# Patient Record
Sex: FEMALE | Race: WHITE
[De-identification: names, ages, dates, MRNs, and addresses within clinical notes are randomized per-mention and may not be internally consistent; named-entity substitution may affect disease eponyms.]

---

## 2017-07-20 ENCOUNTER — HOSPITAL ENCOUNTER (OUTPATIENT)
Dept: HOSPITAL 39 - GMAM | Age: 67
End: 2017-07-20
Attending: FAMILY MEDICINE
Payer: MEDICARE

## 2017-07-20 DIAGNOSIS — R53.83: Primary | ICD-10-CM

## 2017-07-21 ENCOUNTER — HOSPITAL ENCOUNTER (OUTPATIENT)
Dept: HOSPITAL 39 - GMAM | Age: 67
Discharge: HOME | End: 2017-07-21
Attending: FAMILY MEDICINE
Payer: MEDICARE

## 2017-07-21 DIAGNOSIS — N39.0: Primary | ICD-10-CM

## 2018-02-09 ENCOUNTER — HOSPITAL ENCOUNTER (OUTPATIENT)
Dept: HOSPITAL 39 - US | Age: 68
End: 2018-02-09
Attending: NURSE PRACTITIONER
Payer: MEDICARE

## 2018-02-09 DIAGNOSIS — K76.0: ICD-10-CM

## 2018-02-09 DIAGNOSIS — R10.9: Primary | ICD-10-CM

## 2018-02-12 NOTE — US
EXAM DESCRIPTION: 



Abdomen,Limited



CLINICAL HISTORY: 



R10.9 UNSPECIFIED ABDOMINAL PAIN.  (LIVER)



COMPARISON: 



None available.



FINDINGS: 



Aorta: Not visualized.

IVC: Not well visualized.

Ascites: None.

Pancreas: Partially obscured by overlying bowel gas but

visualized portions normal.

Liver: The liver appears slightly hyperechoic. No mass or other

focal liver lesion. No intrahepatic biliary duct dilation.

Physiologic flow is noted in the main portal vein.

Gallbladder/Common Duct: The gallbladder is, by history,

surgically absent. The common duct is not dilated, measuring 5 mm

diameter at the emmanuel hepatis.

Right Kidney: No stones, hydronephrosis, atrophy or mass.





IMPRESSION: 



Diffuse fatty infiltration of the liver, otherwise unremarkable

exam.



Electronically signed by:  Dawson Estrada MD  2/12/2018 9:01 AM [facility]

Workstation: [id number]

## 2018-04-08 ENCOUNTER — HOSPITAL ENCOUNTER (EMERGENCY)
Dept: HOSPITAL 39 - ER | Age: 68
LOS: 1 days | Discharge: HOME | End: 2018-04-09
Payer: MEDICARE

## 2018-04-08 VITALS — TEMPERATURE: 97.8 F | DIASTOLIC BLOOD PRESSURE: 99 MMHG | OXYGEN SATURATION: 98 % | SYSTOLIC BLOOD PRESSURE: 207 MMHG

## 2018-04-08 DIAGNOSIS — Z85.3: ICD-10-CM

## 2018-04-08 DIAGNOSIS — G89.29: ICD-10-CM

## 2018-04-08 DIAGNOSIS — E11.9: ICD-10-CM

## 2018-04-08 DIAGNOSIS — I10: ICD-10-CM

## 2018-04-08 DIAGNOSIS — F03.90: ICD-10-CM

## 2018-04-08 DIAGNOSIS — J44.9: ICD-10-CM

## 2018-04-08 DIAGNOSIS — M51.36: Primary | ICD-10-CM

## 2018-04-08 NOTE — ED.PDOC
History of Present Illness





- General


Chief Complaint: Back Pain or Injury


Stated Complaint: low back pain, thinks its from bulging disk


Time Seen by Provider: 04/08/18 23:50


Source: RN notes reviewed, Vital Signs reviewed


Additional Information: 





67 YEAR OLD WHITE FEMALE WHO HAS BEEN DIAGNOSED WITH LUMBAR DISC PROLAPSE IN 

2015  PRESENTS WITH ACUTE EXACERBATION OF THE LOWER BACK PAIN WITH RADIATION TO 

THE RIGHT LEG FOR THE PAST FEW DAYS 


SHE HAS REMISSIONS AND EXACERBATIONS OF THIS CHRONIC BACK ISSUE DEPENDING ON 

THE STRESS SHE ENDURES DOING SIMPLE TASK AT HOME 


SHE ALSO REPORTS PARESTHESIA ON THE RIGHT LOWER EXTREMITY


SHE IN ADDITION HAS DM HTN  AND DISLIPIDEMIA  





- History of Present Illness


Timing/Duration: days


Quality/Severity: moderate


Back Pain Location: lumbar spine


Allergies/Adverse Reactions: 


Allergies





NO KNOWN ALLERGY Allergy (Verified 04/08/18 23:39)


 








Home Medications: 


Ambulatory Orders





Acetaminophen W/ Codeine [Tylenol W/ CODEINE #3] 1 ea PO Q4-6H PRN #14  02/23/

15 


Acetamin W/Cod #3 Tab [Tylenol w/CODEINE #3] 1 ea PO Q6HR PRN #40 tab 04/08/18 


Methocarbamol [Robaxin] 750 mg PO Q8HRS 10 Days #30 tab 04/08/18 











Review of Systems





- Review of Systems


Constitutional: States: no symptoms reported


EENTM: States: no symptoms reported


Respiratory: States: no symptoms reported


Cardiology: States: no symptoms reported


Gastrointestinal/Abdominal: States: no symptoms reported


Genitourinary: States: no symptoms reported


Musculoskeletal: States: no symptoms reported


Skin: States: no symptoms reported


Neurological: States: no symptoms reported


Endocrine: States: no symptoms reported


Hematologic/Lymphatic: States: no symptoms reported





Past Medical History (General)





- Patient Medical History


Hx Seizures: No


Hx Stroke: No


Hx Dementia: Yes


Hx Asthma: No


Hx of COPD: Yes


Hx Cardiac Disorders: No


Hx Congestive Heart Failure: No


Hx Pacemaker: No


Hx Hypertension: Yes


Hx Thyroid Disease: No


Hx Diabetes: Yes


Hx Gastroesophageal Reflux: Yes


Hx Renal Disease: No


Hx Cancer: Yes - breast


Hx of HIV: No


Hx Hepatitis C: No


Hx MRSA: No





- Vaccination History


Hx Tetanus, Diphtheria Vaccination: No


Hx Influenza Vaccination: No





- Social History


Hx Tobacco Use: Yes


Hx Alcohol Use: No


Hx Substance Use: No


Hx Substance Use Treatment: No


Hx Depression: No





- Female History


Patient Pregnant: No





Family Medical History





- Family History


  ** Mother


Family History: Unknown





Physical Exam





- Physical Exam


General Appearance: Anxious, Comfortable


Eyes, Ears, Nose, Throat Exam: PERRL/EOMI, normal ENT inspection, TMs normal


Neck Exam: non-tender, full range of motion, normal alignment


Cardiovascular/Respiratory: regular rate, rhythm, normal peripheral pulses, no 

JVD, normal breath sounds


Gastrointestinal/Abdominal: normal bowel sounds, non tender, soft, no 

organomegaly, no pulsatile mass


Back Exam: muscle spasm


Extremity Exam: normal range of motion, non-tender, no pedal edema


Neurologic: CNs II-XII nml as tested, no motor/sensory deficits, alert, normal 

mood/affect, oriented x 3





Departure





- Departure


Clinical Impression: 


 Degeneration of lumbar intervertebral disc





Disposition: Discharge to Home or Self Care


Condition: Good


Departure Forms:  ED Discharge - Pt. Copy, Patient Portal Self Enrollment


Referrals: 


Greg Dotson MD [Primary Care Provider] - 1-2 Weeks


Prescriptions: 


Acetamin W/Cod #3 Tab [Tylenol w/CODEINE #3] 1 ea PO Q6HR PRN #40 tab


 PRN Reason: Mild To Moderate Pain


Methocarbamol [Robaxin] 750 mg PO Q8HRS 10 Days #30 tab


Home Medications: 


Ambulatory Orders





Acetaminophen W/ Codeine [Tylenol W/ CODEINE #3] 1 ea PO Q4-6H PRN #14  02/23/

15 


Acetamin W/Cod #3 Tab [Tylenol w/CODEINE #3] 1 ea PO Q6HR PRN #40 tab 04/08/18 


Methocarbamol [Robaxin] 750 mg PO Q8HRS 10 Days #30 tab 04/08/18

## 2018-04-16 ENCOUNTER — HOSPITAL ENCOUNTER (EMERGENCY)
Dept: HOSPITAL 39 - ER | Age: 68
Discharge: HOME | End: 2018-04-16
Payer: MEDICARE

## 2018-04-16 VITALS — SYSTOLIC BLOOD PRESSURE: 154 MMHG | OXYGEN SATURATION: 96 % | TEMPERATURE: 97.8 F | DIASTOLIC BLOOD PRESSURE: 75 MMHG

## 2018-04-16 DIAGNOSIS — K76.0: ICD-10-CM

## 2018-04-16 DIAGNOSIS — M51.37: Primary | ICD-10-CM

## 2018-04-16 DIAGNOSIS — G89.29: ICD-10-CM

## 2018-04-16 DIAGNOSIS — J44.9: ICD-10-CM

## 2018-04-16 DIAGNOSIS — Z85.3: ICD-10-CM

## 2018-04-16 DIAGNOSIS — I10: ICD-10-CM

## 2018-04-16 DIAGNOSIS — E11.9: ICD-10-CM

## 2018-04-16 DIAGNOSIS — Z90.13: ICD-10-CM

## 2018-04-16 PROCEDURE — 80053 COMPREHEN METABOLIC PANEL: CPT

## 2018-04-16 PROCEDURE — 74018 RADEX ABDOMEN 1 VIEW: CPT

## 2018-04-16 PROCEDURE — 85025 COMPLETE CBC W/AUTO DIFF WBC: CPT

## 2018-04-16 NOTE — RAD
Procedure:  XR ABDOMEN 1 VIEW (KUB)        



Exam Date:  4/16/2018



Ordering Provider:  STEVEN NGO



Clinical Indication:  constipation 



Comparison: None



Findings: 

There is no bowel distention. Large stool burden in the right

colon.

There is no definite pneumoperitoneum. Diaphragm excluded from

the exam.

There are no suspicious calcifications. Pelvic phleboliths.

There is no acute osseous abnormality.



Impression: 

1. No acute findings.

2. Large stool burden in the right colon.







Electronically signed by:  Alok Holden MD  4/16/2018 6:36 AM CDT

Workstation: [id number]

## 2018-04-16 NOTE — ED.PDOC
History of Present Illness





- General


Chief Complaint: Back Pain or Injury


Stated Complaint: low back pain


Time Seen by Provider: 04/16/18 05:46


Source: patient


Exam Limitations: no limitations





- History of Present Illness


Initial Comments: 








patient comes in today for severe low back pain.  Patient states in 2015 she 

was diagnosed with a ruptured disc in the lumbar spinal area.  Normally she 

does fine with over-the-counter ibuprofen type products.  However, a week ago, 

patient was performing some Spring cleaning and irritated her back.  She was 

brought into the emergency room and given Tylenol 3 and Robaxin.  Patient 

states since then it has failed to improve and she has been in severe pain ever 

since.  Patient states additionally, she has been unable to go the bathroom for 

3 or 4 days and feels like her entire body is swollen and bloated since she was 

given the pain medication.  Patient feels like she is retaining fluid but also 

knows she has not been making as much urine as she normally does.  Patient 

states she feels numbness throughout her entire body and yet still is feeling 

severe sharp pain in the lumbar spinal area.  The pain is much worse when she 

tries to move.  Patient states she was given steroids and that this seems to 

have made things worse as well.


Past medical history


1.  Diabetes mellitus


2.  Hypertension


3.  Chronic back pain


4.  Fatty liver disease 





Past surgical history


1.  Tonsillectomy


2.  Cholecystectomy


3.  Appendectomy


4.  Hysterectomy


5.  Bilateral mastectomy


6.  Breast implants





Social history


Patient has not smoked for several years but has greater than a 30-pack-year 

history.  She does not drink or take illicit substances.  [place] prescription 

monitoring system shows only recent prescription for Tylenol No. 3 and several 

month history of prescription for clonazepam


Timing/Duration: 1 week


Quality/Severity: severe, sharpness


Back Pain Location: lumbar spine, paraspinous muscles


Method of Injury/Prior Injury: other - spring cleaning


Improving Factors: nothing


Worsening Factors: movement


Allergies/Adverse Reactions: 


Allergies





NO KNOWN ALLERGY Allergy (Verified 04/08/18 23:39)


 








Home Medications: 


Ambulatory Orders





Acetaminophen W/ Codeine [Tylenol W/ CODEINE #3] 1 ea PO Q4-6H PRN #14  02/23/

15 


Acetamin W/Cod #3 Tab [Tylenol w/CODEINE #3] 1 ea PO Q6HR PRN #40 tab 04/08/18 


Methocarbamol [Robaxin] 750 mg PO Q8HRS 10 Days #30 tab 04/08/18 


"Low Dose Blood Pressure Med"  04/16/18 


Glyburide  04/16/18 











Review of Systems





- Review of Systems


Constitutional: States: weakness.  Denies: chills, fever


EENTM: States: no symptoms reported.  Denies: ear pain, nose pain, throat pain


Respiratory: States: no symptoms reported.  Denies: cough, short of breath, 

wheezing


Cardiology: States: no symptoms reported.  Denies: chest pain, palpitations, 

syncope


Gastrointestinal/Abdominal: States: no symptoms reported.  Denies: abdominal 

pain, diarrhea, nausea, vomiting


Genitourinary: States: see HPI


Musculoskeletal: States: see HPI


Skin: States: no symptoms reported


Neurological: States: numbness, paresthesia





Past Medical History (General)





- Patient Medical History


Hx Seizures: No


Hx Stroke: No


Hx Dementia: Yes


Hx Asthma: No


Hx of COPD: Yes


Hx Cardiac Disorders: No


Hx Congestive Heart Failure: No


Hx Pacemaker: No


Hx Hypertension: Yes


Hx Thyroid Disease: No


Hx Diabetes: Yes


Hx Gastroesophageal Reflux: Yes


Hx Renal Disease: No


Hx Cancer: Yes - breast


Hx of HIV: No


Hx Hepatitis C: No


Hx MRSA: No


Surgical History: appendectomy, cancer surgery, cholecystectomy, Hysterectomy





- Vaccination History


Hx Tetanus, Diphtheria Vaccination: No


Hx Influenza Vaccination: No


Immunizations Up to Date: Yes





- Social History


Hx Tobacco Use: Yes


Hx Alcohol Use: No


Hx Substance Use: No


Hx Substance Use Treatment: No


Hx Depression: No





- Female History


Patient is a Female of Child Bearing Age (10 -59 yrs old): No


Patient Pregnant: No





- Triage Comment


ED Triage Comment: pain for past week since seen in ER for same 2/08/18.  

States body is numb all over, but also pain all over





Family Medical History





- Family History


  ** Mother


Family History: Unknown





Physical Exam





- Physical Exam


General Appearance: Agitated, Restless


Eyes, Ears, Nose, Throat Exam: PERRL/EOMI, normal ENT inspection, TMs normal, 

pharynx normal


Neck Exam: non-tender, full range of motion, normal alignment


Cardiovascular/Respiratory: regular rate, rhythm, no M/R/G, normal peripheral 

pulses, no JVD


Peripheral Pulses: radial,right: 2+


Gastrointestinal/Abdominal: normal bowel sounds, non tender, soft, no 

organomegaly, no pulsatile mass


Back Exam: vertebral tenderness, other - tenderness to palpation at paraspinal 

muscles andL4-L5 no gross deformities no bruising muscle spasms felt throughout 

the low back area


Neurologic: CNs II-XII nml as tested, alert, oriented x 3, other - DTRs are 2+/

4 motor is 5 out of 5 for  and lower extremity strength


Skin Exam: normal color





Progress





- Progress


Progress: 





04/16/18 06:38


 





04/16/18 06:18


UA [URINALYSIS] Stat 








 Laboratory Results











WBC  11.0 K/mm3 (4.8-10.8)  H  04/16/18  06:00    


 


RBC  4.72 M/mm3 (4.20-5.40)   04/16/18  06:00    


 


Hgb  14.6 gm/dL (12.0-16.0)   04/16/18  06:00    


 


Hct  43.4 % (36.0-47.0)   04/16/18  06:00    


 


MCV  91.9 fl (81.0-99.0)   04/16/18  06:00    


 


MCH  30.9 pg (27.0-31.0)   04/16/18  06:00    


 


MCHC  33.6 g/dL (33.0-37.0)   04/16/18  06:00    


 


RDW  13.5 





% (11.5-14.5)   04/16/18  06:00    


 


Plt Count  246 K/mm3 (130-400)   04/16/18  06:00    


 


MPV  7.6 fl (7.40-10.4)   04/16/18  06:00    


 


Absolute Neuts (auto)  6.70 K/uL (1.8-6.8)   04/16/18  06:00    


 


Absolute Lymphs (auto)  2.60 K/uL (1.0-3.4)   04/16/18  06:00    


 


Absolute Monos (auto)  1.10 K/uL (0.2-0.8)  H  04/16/18  06:00    


 


Absolute Eos (auto)  0.70 K/uL (0.0-0.4)  H  04/16/18  06:00    


 


Absolute Basos (auto)  0.10 K/uL (0.0-0.1)   04/16/18  06:00    


 


Neutrophils %  60.3 % (42.0-78.0)   04/16/18  06:00    


 


Lymphocytes %  23.2 % (20.0-50.0)   04/16/18  06:00    


 


Monocytes %  9.8 % (2.0-9.0)  H  04/16/18  06:00    


 


Eosinophils %  6.2 % (1.0-5.0)  H  04/16/18  06:00    


 


Basophils %  0.5 % (0.0-2.0)   04/16/18  06:00    


 


Sodium  139 mmol/L (135-145)   04/16/18  06:00    


 


Potassium  3.4 mmol/L (3.6-5.0)  L  04/16/18  06:00    


 


Chloride  103 mmol/L (101-111)   04/16/18  06:00    


 


Carbon Dioxide  27 mmol/L (21-31)   04/16/18  06:00    


 


Anion Gap  12.4  (12-18)   04/16/18  06:00    


 


BUN  22 mg/dL (7-18)  H  04/16/18  06:00    


 


Creatinine  0.82 mg/dL (0.6-1.3)   04/16/18  06:00    


 


BUN/Creatinine Ratio  26.8  (10-20)  H  04/16/18  06:00    


 


Random Glucose  114 mg/dL ()  H  04/16/18  06:00    


 


Serum Osmolality  281.7 mOsm/L (275-295)   04/16/18  06:00    


 


Calcium  9.3 mg/dL (8.4-10.2)   04/16/18  06:00    


 


Total Bilirubin  1.1 mg/dL (0.2-1.0)  H  04/16/18  06:00    


 


AST  78 IU/L (10-42)  H  04/16/18  06:00    


 


ALT  90 IU/L (10-60)  H  04/16/18  06:00    


 


Alkaline Phosphatase  124 IU/L ()  H  04/16/18  06:00    


 


Serum Total Protein  6.8 gm/dL (6.4-8.2)   04/16/18  06:00    


 


Albumin  3.7 g/dl (3.2-5.5)   04/16/18  06:00    


 


Globulin  3.1 gm/dL (2.3-3.5)   04/16/18  06:00    


 


Albumin/Globulin Ratio  1.2  (1.1-1.9)   04/16/18  06:00    











04/16/18 06:55


patient does have some relief after IV Tylenol.  She still is in moderately 

severe pain but is able to at least handle the pain able to move some.  She 

states that she does have an MRI scheduled for later today with her doctor in 

[place] hometown.  She does not want anything stronger than this that may cause 

worsening constipation and she will follow up as scheduled for her MRI and with 

her PCP for possible pain management consultation later on today





- Results/Orders


Results/Orders: 





Patient Name: ALYSSA SALMON


Patient ID: [id number]


Gender: Female


YOB: 1950


Home Phone: [phone]


Referring Physician: STEVEN NGO


Organization: [facility]


Accession Number: [id number]


Requested Date: April 16, 2018 05:46


Report Status: Final


Requested Procedure: 1


Procedure Description: Abdomen 1 View


Modality: CR


Findings


Reporting MD: Alok Holden


Fellow MD: Not available


Dictation Time:


: Not available


Transcription Date:


Procedure: XR ABDOMEN 1 VIEW (KUB)


Exam Date: 4/16/2018


Ordering Provider: STEVEN NGO


Clinical Indication: constipation


Comparison: None


Findings:


There is no bowel distention. Large stool burden in the right


colon.


There is no definite pneumoperitoneum. Diaphragm excluded from


the exam.


There are no suspicious calcifications. Pelvic phleboliths.


There is no acute osseous abnormality.


Impression:


1. No acute findings.


2. Large stool burden in the right colon.


Electronically signed by: Alok Holden MD 4/16/2018 6:36 AM CDT


Workstation: [id number]





Departure





- Departure


Clinical Impression: 


 Degeneration of lumbosacral intervertebral disc





Disposition: Discharge to Home or Self Care


Condition: Fair


Departure Forms:  ED Discharge - Pt. Copy, Patient Portal Self Enrollment


Instructions:  DI for Low Back Pain


Activity: increase activity as tolerated


Referrals: 


Greg Dotson MD [Primary Care Provider] - 1-2 Weeks


Home Medications: 


Ambulatory Orders





Acetaminophen W/ Codeine [Tylenol W/ CODEINE #3] 1 ea PO Q4-6H PRN #14  02/23/

15 


Acetamin W/Cod #3 Tab [Tylenol w/CODEINE #3] 1 ea PO Q6HR PRN #40 tab 04/08/18 


Methocarbamol [Robaxin] 750 mg PO Q8HRS 10 Days #30 tab 04/08/18 


"Low Dose Blood Pressure Med"  04/16/18 


Glyburide  04/16/18 








Additional Instructions: 


keep appointment today for MRI and follow-up with PCP for pain management 

consultation.  Return to ER for increasing abdominal pain, numbness, change in 

sensation to extremities.    Would continue MiraLAX 17 g by mouth daily over-the

-counter for constipation for the next several days

## 2018-09-07 ENCOUNTER — HOSPITAL ENCOUNTER (OUTPATIENT)
Dept: HOSPITAL 39 - GMAM | Age: 68
End: 2018-09-07
Attending: FAMILY MEDICINE
Payer: MEDICARE

## 2018-09-07 DIAGNOSIS — R41.82: Primary | ICD-10-CM

## 2018-09-07 DIAGNOSIS — E11.9: ICD-10-CM

## 2018-09-12 ENCOUNTER — HOSPITAL ENCOUNTER (OUTPATIENT)
Dept: HOSPITAL 39 - MRI | Age: 68
End: 2018-09-12
Attending: FAMILY MEDICINE
Payer: MEDICARE

## 2018-09-12 DIAGNOSIS — R41.82: Primary | ICD-10-CM

## 2018-09-12 NOTE — MRI
EXAM DESCRIPTION: 

Brain w/o Contrast : MRI.



CLINICAL HISTORY: 

ACUTE CONFUSION



COMPARISON: 

CT head August 6, 2018 and August 4, 2018.



TECHNIQUE: 

Multiplanar, high-field MRI unit, multiple diffusion sequences,

multiple conventional sequences without contrast. Significant

limitations to the study due to patient motion. 3 sequences

(axial T2, axial T1, and axial gradient echo) were too degraded

for recording.



FINDINGS: 

Minimally hyperintense FLAIR signal in the periventricular white

matter, abutting the frontal horns bilaterally . No hemorrhage,

no cerebral edema, no mass-effect.  Not in the basal ganglia,

which demonstrates hypodense signal. Normal signal in the

brainstem and cerebellar hemispheres. No hemorrhage, no cerebral

edema, no mass-effect.

 

Concordance of the diffusion and non-diffusion sequences with no

diffusion restriction. Cortical sulci, ventricles, and other CSF

spaces, and the subdural spaces are physiologic for the patient's

age. No effacement or displacement. No midline shift. No

extra-axial hemorrhage.

 

Pituitary gland occupies approximately 50% of the sella. Base of

the cerebellar tonsils is at the level of the foramen magnum. The

bony calvarium is intact.



IMPRESSION: 

1. Significant limitations of the study due to patient motion

with 3 sequences to degraded for recording.

2. No mass effect or midline shift. No significant cerebral

edema. No intra-axial or extra-axial hemorrhage. No diffusion

restriction which would indicate acute or subacute infarction. No

hydrocephalus. No significant cortical or central atrophy for the

patient's age.



Electronically signed by:  Jamari Colindres MD  9/12/2018 2:00 PM CDT

Workstation: [id number]

## 2018-09-14 ENCOUNTER — HOSPITAL ENCOUNTER (OUTPATIENT)
Dept: HOSPITAL 39 - GMAM | Age: 68
End: 2018-09-14
Attending: FAMILY MEDICINE
Payer: MEDICARE

## 2018-09-14 DIAGNOSIS — E11.9: Primary | ICD-10-CM

## 2018-09-14 DIAGNOSIS — R79.9: ICD-10-CM

## 2018-10-04 ENCOUNTER — HOSPITAL ENCOUNTER (INPATIENT)
Dept: HOSPITAL 39 - ER | Age: 68
LOS: 5 days | Discharge: NURSING, REHAB OR LONG-TERM CARE | DRG: 690 | End: 2018-10-09
Attending: NURSE PRACTITIONER | Admitting: NURSE PRACTITIONER
Payer: MEDICARE

## 2018-10-04 DIAGNOSIS — I10: ICD-10-CM

## 2018-10-04 DIAGNOSIS — N39.0: Primary | ICD-10-CM

## 2018-10-04 DIAGNOSIS — F03.90: ICD-10-CM

## 2018-10-04 DIAGNOSIS — E11.9: ICD-10-CM

## 2018-10-04 DIAGNOSIS — G89.29: ICD-10-CM

## 2018-10-04 DIAGNOSIS — M54.9: ICD-10-CM

## 2018-10-04 DIAGNOSIS — Z87.891: ICD-10-CM

## 2018-10-04 DIAGNOSIS — R07.9: ICD-10-CM

## 2018-10-04 DIAGNOSIS — H54.61: ICD-10-CM

## 2018-10-04 DIAGNOSIS — E86.0: ICD-10-CM

## 2018-10-04 DIAGNOSIS — K76.0: ICD-10-CM

## 2018-10-04 RX ADMIN — Medication PRN MLS/HR: at 19:37

## 2018-10-04 RX ADMIN — MEMANTINE HYDROCHLORIDE SCH MG: 10 TABLET ORAL at 21:10

## 2018-10-04 RX ADMIN — ENOXAPARIN SODIUM SCH MG: 40 INJECTION, SOLUTION INTRAVENOUS; SUBCUTANEOUS at 21:13

## 2018-10-04 RX ADMIN — Medication SCH: at 21:31

## 2018-10-04 RX ADMIN — INSULIN LISPRO SCH UNITS: 100 INJECTION, SOLUTION INTRAVENOUS; SUBCUTANEOUS at 21:10

## 2018-10-04 NOTE — ED.PDOC
History of Present Illness





- General


Chief Complaint: Syncope/Near Syncope


Stated Complaint: Altered mental status


Time Seen by Provider: 10/04/18 12:25


Source: patient, family, EMS


Exam Limitations: clinical condition





- History of Present Illness


Initial Comments: 





Patient presents from home by EMS after a caregivers noticed that she grabbed 

her arm and chest about one hour PTA.  She indicated that it was painful. Upon 

arrival she denies pain.  She has significant dementia that one of her 

caregivers named Tone, said waxes and wanes. He reports that it has been much 

worse since the passing of her  3 months ago.  He believes her mental 

status is worse today than normal.  Patient is an occasional smoker with no 

history of CVA or AMI. No other information is available. 


Timing/Duration: 1 hour


Severity: mild


Improving Factors: nothing


Worsening Factors: nothing


Associated Symptoms: denies symptoms


Allergies/Adverse Reactions: 


Allergies





NO KNOWN ALLERGY Allergy (Verified 08/05/18 01:03)


 








Home Medications: 


Ambulatory Orders





Methocarbamol [Robaxin] 750 mg PO Q8HRS 10 Days #30 tab 04/08/18 


"Low Dose Blood Pressure Med"  04/16/18 


Glyburide 5 mg PO BID 04/16/18 











Review of Systems





- Review of Systems


Constitutional: States: no symptoms reported


EENTM: States: no symptoms reported


Respiratory: States: no symptoms reported


Cardiology: States: see HPI


Gastrointestinal/Abdominal: States: no symptoms reported


Genitourinary: States: no symptoms reported


Musculoskeletal: States: no symptoms reported


Skin: States: no symptoms reported


Neurological: States: see HPI


Endocrine: States: no symptoms reported


Hematologic/Lymphatic: States: no symptoms reported





Past Medical History (General)





- Patient Medical History


Hx Seizures: No


Hx Stroke: No


Hx Dementia: Yes


Hx Asthma: No


Hx of COPD: Yes


Hx Cardiac Disorders: No


Hx Congestive Heart Failure: No


Hx Pacemaker: No


Hx Hypertension: Yes


Hx Thyroid Disease: No


Hx Diabetes: Yes


Hx Gastroesophageal Reflux: Yes


Hx Renal Disease: No


Hx Cancer: Yes - breast


Hx of HIV: No


Hx Hepatitis C: No


Hx MRSA: No





- Vaccination History


Hx Tetanus, Diphtheria Vaccination: No


Hx Influenza Vaccination: No


Hx Pneumococcal Vaccination:  - unknown





- Social History


Hx Tobacco Use: Yes


Hx Alcohol Use: No


Hx Substance Use: No


Hx Substance Use Treatment: No


Hx Depression: No





- Female History


Patient Pregnant: No





Family Medical History





- Family History


  ** Mother


Family History: Unknown





Physical Exam





- Physical Exam


General Appearance: Alert, No apparent distress


Eye Exam: right abnormal pupil - dilated and non-reactive, she is blind in that 

eye, uknown origin


Ears, Nose, Throat: normal ENT inspection


Neck: non-tender, full range of motion, supple


Respiratory: lungs clear, normal breath sounds


Cardiovascular/Chest: normal peripheral pulses, regular rate, rhythm, no edema


Gastrointestinal/Abdominal: normal bowel sounds, non tender, soft


Back Exam: normal inspection, no CVA tenderness


Extremity: normal range of motion, non-tender, normal inspection


Neurologic: CNs II-XII nml as tested, no motor/sensory deficits, alert, other - 

oriented to person only


Skin Exam: normal color


Lymphatic: no adenopathy





Progress





- Progress


Progress: 





10/04/18 15:37


 Laboratory Tests











  10/04/18 10/04/18 10/04/18





  12:00 12:00 12:00


 


WBC  11.9 H  


 


RBC  5.30  


 


Hgb  16.4 H  


 


Hct  49.4 H  


 


MCV  93.2  


 


MCH  30.9  


 


MCHC  33.1  


 


RDW  14.3  


 


Plt Count  323  


 


MPV  8.4  


 


Absolute Neuts (auto)  8.20 H  


 


Absolute Lymphs (auto)  2.70  


 


Absolute Monos (auto)  0.70  


 


Absolute Eos (auto)  0.20  


 


Absolute Basos (auto)  0.00  


 


Neutrophils %  69.2  


 


Lymphocytes %  22.9  


 


Monocytes %  6.0  


 


Eosinophils %  1.5  


 


Basophils %  0.4  


 


PT   9.9 


 


INR   0.99 


 


PTT (SP)   25.7 


 


Sodium    137


 


Potassium    4.1


 


Chloride    99 L


 


Carbon Dioxide    24


 


Anion Gap    18.1 H


 


BUN    23 H


 


Creatinine    1.00


 


BUN/Creatinine Ratio    23.0 H


 


Random Glucose    167 H


 


Hemoglobin A1c   


 


Serum Osmolality    281.3


 


Calcium    10.5 H


 


Total Bilirubin    1.4 H


 


AST    76 H


 


ALT    88 H


 


Alkaline Phosphatase    146 H


 


Creatine Kinase   


 


CK-MB (CK-2)   


 


CK-MB (CK-2) %   


 


Troponin I   


 


B-Natriuretic Peptide   


 


Serum Total Protein    8.5 H


 


Albumin    4.4


 


Globulin    4.1 H


 


Albumin/Globulin Ratio    1.1


 


TSH   


 


Thyroxine (T4)   


 


Urine Color   


 


Urine Appearance   


 


Urine pH   


 


Ur Specific Gravity   


 


Urine Protein   


 


Urine Glucose (UA)   


 


Urine Ketones   


 


Urine Blood   


 


Urine Nitrite   


 


Urine Bilirubin   


 


Urine Urobilinogen   


 


Ur Leukocyte Esterase   


 


Urine RBC   


 


Urine WBC   


 


Ur Epithelial Cells   


 


Urine Bacteria   














  10/04/18 10/04/18 10/04/18





  12:00 12:00 12:32


 


WBC   


 


RBC   


 


Hgb   


 


Hct   


 


MCV   


 


MCH   


 


MCHC   


 


RDW   


 


Plt Count   


 


MPV   


 


Absolute Neuts (auto)   


 


Absolute Lymphs (auto)   


 


Absolute Monos (auto)   


 


Absolute Eos (auto)   


 


Absolute Basos (auto)   


 


Neutrophils %   


 


Lymphocytes %   


 


Monocytes %   


 


Eosinophils %   


 


Basophils %   


 


PT   


 


INR   


 


PTT (SP)   


 


Sodium   


 


Potassium   


 


Chloride   


 


Carbon Dioxide   


 


Anion Gap   


 


BUN   


 


Creatinine   


 


BUN/Creatinine Ratio   


 


Random Glucose   


 


Hemoglobin A1c  6.0  


 


Serum Osmolality   


 


Calcium   


 


Total Bilirubin   


 


AST   


 


ALT   


 


Alkaline Phosphatase   


 


Creatine Kinase    89


 


CK-MB (CK-2)    1.0


 


CK-MB (CK-2) %    Not Reportable


 


Troponin I    < 0.02


 


B-Natriuretic Peptide    20.7


 


Serum Total Protein   


 


Albumin   


 


Globulin   


 


Albumin/Globulin Ratio   


 


TSH   0.35 


 


Thyroxine (T4)   10.28 


 


Urine Color   


 


Urine Appearance   


 


Urine pH   


 


Ur Specific Gravity   


 


Urine Protein   


 


Urine Glucose (UA)   


 


Urine Ketones   


 


Urine Blood   


 


Urine Nitrite   


 


Urine Bilirubin   


 


Urine Urobilinogen   


 


Ur Leukocyte Esterase   


 


Urine RBC   


 


Urine WBC   


 


Ur Epithelial Cells   


 


Urine Bacteria   














  10/04/18





  13:53


 


WBC 


 


RBC 


 


Hgb 


 


Hct 


 


MCV 


 


MCH 


 


MCHC 


 


RDW 


 


Plt Count 


 


MPV 


 


Absolute Neuts (auto) 


 


Absolute Lymphs (auto) 


 


Absolute Monos (auto) 


 


Absolute Eos (auto) 


 


Absolute Basos (auto) 


 


Neutrophils % 


 


Lymphocytes % 


 


Monocytes % 


 


Eosinophils % 


 


Basophils % 


 


PT 


 


INR 


 


PTT (SP) 


 


Sodium 


 


Potassium 


 


Chloride 


 


Carbon Dioxide 


 


Anion Gap 


 


BUN 


 


Creatinine 


 


BUN/Creatinine Ratio 


 


Random Glucose 


 


Hemoglobin A1c 


 


Serum Osmolality 


 


Calcium 


 


Total Bilirubin 


 


AST 


 


ALT 


 


Alkaline Phosphatase 


 


Creatine Kinase 


 


CK-MB (CK-2) 


 


CK-MB (CK-2) % 


 


Troponin I 


 


B-Natriuretic Peptide 


 


Serum Total Protein 


 


Albumin 


 


Globulin 


 


Albumin/Globulin Ratio 


 


TSH 


 


Thyroxine (T4) 


 


Urine Color  Yellow


 


Urine Appearance  Cloudy


 


Urine pH  5.5


 


Ur Specific Gravity  >= 1.030


 


Urine Protein  100 H


 


Urine Glucose (UA)  Negative


 


Urine Ketones  40 H


 


Urine Blood  Trace-intact H


 


Urine Nitrite  Negative


 


Urine Bilirubin  Moderate


 


Urine Urobilinogen  1.0


 


Ur Leukocyte Esterase  Small H


 


Urine RBC  5-10 H


 


Urine WBC  20-30 H


 


Ur Epithelial Cells  20-30


 


Urine Bacteria  1+








CT head negative for acute disease. . UA showed mild UTI.  BUN/Cr and urine 

specific gravity indicate moderate dehydration.  Her AMS is likely due to a 

combination of both of those. She was given NS one liter IV bolus x one and 

Rocephin 1 gram IV x one in the E.D. Admitted to [facility] per Carmen Albarran.





Departure





- Departure


Clinical Impression: 


 Dehydration, UTI (urinary tract infection), Altered mental status





Disposition: Admit Patient


Condition: Fair


Departure Forms:  ED Discharge - Pt. Copy, Patient Portal Self Enrollment


Diet: other - as per hospitalist


Activity: other - as per hospitalist


Referrals: 


Bud Mcdonald MD [Primary Care Provider] - 1-2 Weeks


Home Medications: 


Ambulatory Orders





Methocarbamol [Robaxin] 750 mg PO Q8HRS 10 Days #30 tab 04/08/18 


"Low Dose Blood Pressure Med"  04/16/18 


Glyburide 5 mg PO BID 04/16/18

## 2018-10-04 NOTE — CT
Study: CT of the Head. 



Indication: altered mental status



Technique: Axial CT images of the head were acquired without

intravenous contrast. This exam was performed according to our

departmental dose-optimization program, which includes automated

exposure control, adjustment of the mA and/or kV according to

patient size and/or use of iterative reconstruction technique.



Comparison: August 6, 2018.



Findings:



No CT evidence of acute ischemia, acute hemorrhage, mass, mass

effect, midline shift, or extra-axial fluid collection. 

Ventricles are normal in configuration without hydrocephalus. 

Patchy hypoattenuation of the periventricular and subcortical

white matter noted. This is nonspecific but most consistent with

chronic microvascular ischemic change. Global parenchymal volume

loss and intracranial atherosclerosis noted as well. 

Paranasal sinuses are adequately aerated. 

Mastoid air cells are adequately aerated. 

Osseous structures and soft tissues are unremarkable. 



Impression: 



1.  No CT evidence of acute intracranial abnormality. 

2.  Senescent changes. 



Electronically signed by:  Newton Chun MD  10/4/2018 1:09 PM CDT

Workstation: [id number]

## 2018-10-04 NOTE — HP
SUPERVISING PHYSICIAN:  Roel Moreno M.D.



CHIEF COMPLAINT:  Chest pain.



HISTORY OF PRESENT ILLNESS:  This is a 68 year-old female patient who has had a 
recent history of psychosis.  Her   several months ago and she lives 
at home.  In August, she was brought to the hospital for an almost obtunded 
state and was actually sent to [facility] in [place].  The son 
lives in [place] and we have no access to any medical records or history from 
that actual visit, but she has 24 hour caretakers today and she presented to 
the Emergency Room due to chest pain for about 1 hour prior to arrival.  Her 
caretaker said she was having difficulty breathing and was grasping her chest.  
The patient said that she was having chest pain.  The caretaker got up to 
assist her and she actually slumped over for a few seconds.  Previous to this 
episode, she had called for help and about that time someone came in.  They 
assisted the patient.  EMS had already been contacted and she was brought to 
the Emergency Room.  In the E. R., her temperature was 96.4, heart rate 95, 
blood pressure 125/81, respiratory rate 22, O2 sat was 94% on room air.  Labs 
were drawn and her WBCs were 11,900 with hemoglobin 16.6 and hematocrit 49.4.  
Sodium 137, potassium 4.1, chloride 99, carbon dioxide 24, BUN 23, creatinine 1
, glucose 167.  Hemoglobin A1c was 6, calcium 10.5.  Total bilirubin 1.4, AST 76
, ALT 88, alkaline phosphatase 146.  Serum total protein 8.5, globulin 4.1.  
Her initial set of cardiac enzymes were negative.  EKG showed sinus rhythm.  
She also had a urinalysis done and it was positive for a urinary tract 
infection with 100 urine protein, 40 urine ketones, small amount of leukocyte 
esterase, 5 to 10 urine RBCs and 20 to 30 urine WBCs.  She was given some fluid 
in the E. R. and started on Rocephin.  I was called for hospital admission.



PAST MEDICAL HISTORY: 

1.   Diabetes mellitus that is now diet controlled.

2.   Hypertension.

3.   Chronic back pain.

4.   Fatty liver disease.

5.   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



PAST SURGICAL HISTORY:

1.   Tonsillectomy.

2.   Cholecystectomy.

3.   Appendectomy.

4.   Hysterectomy.

5.   Bilateral mastectomy with bilateral breast implants.



HOME MEDICATIONS:  Need to be verified.



ALLERGIES:  NO KNOWN DRUG ALLERGIES.



FAMILY HISTORY:  Unknown.



SOCIAL HISTORY:  She has a 30 plus pack year history of smoking but stopped 
several years ago.  Per her caretaker, she does not drink anymore but she used 
to occasionally.  She was recently  several months ago.  Lives alone 
with caretakers.  Her son lives in [place].



REVIEW OF SYSTEMS:  Unable to obtain due to the patient's mental status.



PHYSICAL EXAMINATION: 



VITAL SIGNS:  Temperature 97.0, pulse 79, blood pressure 128/66, respiratory 
rate 20, O2 sat 96.



GENERAL:  This is a 68 year-old female patient who is sitting up in her 
hospital bed.  She only says yes or no and does not answer questions correctly.
  She is in no acute distress.  She does make eye contact.



HEENT:  Normocephalic and atraumatic. Left pupil is reactive. Right is dilated 
and fixed. Oropharynx is clear.  Oral mucous membranes are dry.



NECK:  Supple without mass.  There is no jugular venous distention.



CHEST:  Essentially clear to auscultation bilaterally.  There is equal rise and 
fall of the chest with inspiration and expiration.



CARDIOVASCULAR:  Regular rate and rhythm.



GASTROINTESTINAL:  Abdomen is soft, nondistended, non-tender.  Bowel sounds are 
positive.



EXTREMITIES:  No cyanosis, clubbing or edema.



NEUROLOGIC:  She is alert.  She follows with her eyes.  She says yes and no 
frequently but not appropriately. 



LABORATORY:  Labs are as per the History of Present Illness.



MICROBIOLOGY:  Blood cultures were not drawn prior to starting antibiotics, but 
there is a urine culture pending.



RADIOLOGY:  Head CT shows no CT evidence of acute intracranial abnormality with 
senescent changes.  All other labs and films have been reviewed via the EMR.



ASSESSMENT: 

1.   Systemic inflammatory response with a respiratory rate of 22, 
subtherapeutic

      temperature of 96.4 and heart rate 95 on admission.  Most likely source is

      a urinary tract infection.

2.   Dehydration secondary to #1.

3.   Chest pain, initial cardiac enzymes are negative and no further complaint 
of

      chest pain or any EKG changes.

4.   Recent psychosis episode requiring hospitalization at a behavioral health center in [place], [place] in 2018.

5.   Hypertension, well controlled.

6.   Diabetes mellitus no longer on therapy, diet controlled.

7.   Chronic back pain.

8.   Fatty liver disease.



PLAN:  We will admit the patient to the hospital.  She will receive another 
liter or so of fluids depending on response and will put her on maintenance 
fluids overnight.  I have also ordered 2 additional sets of cardiac enzymes to 
rule out any cardiac event.  I will continue the Rocephin and we will monitor 
the urine culture as the results become available.  Will check lab in the 
morning.  I have also ordered sliding scale insulin per protocol.  Initially I 
confirmed her home medications, but there is some discrepancy in that so will 
get the nurses to clarify that.  We will continue to monitor closely and follow 
as needed.  Dr. Moreno is the collaborating physician available for 
consultation.



#[id number]
[facility]

## 2018-10-05 RX ADMIN — LISINOPRIL SCH MG: 5 TABLET ORAL at 09:29

## 2018-10-05 RX ADMIN — CEFTRIAXONE SCH MLS/HR: 1 INJECTION, POWDER, FOR SOLUTION INTRAMUSCULAR; INTRAVENOUS at 15:16

## 2018-10-05 RX ADMIN — MEMANTINE HYDROCHLORIDE SCH MG: 10 TABLET ORAL at 20:45

## 2018-10-05 RX ADMIN — Medication PRN MLS/HR: at 03:25

## 2018-10-05 RX ADMIN — INSULIN LISPRO SCH: 100 INJECTION, SOLUTION INTRAVENOUS; SUBCUTANEOUS at 16:33

## 2018-10-05 RX ADMIN — INSULIN LISPRO SCH: 100 INJECTION, SOLUTION INTRAVENOUS; SUBCUTANEOUS at 20:59

## 2018-10-05 RX ADMIN — INSULIN LISPRO SCH: 100 INJECTION, SOLUTION INTRAVENOUS; SUBCUTANEOUS at 07:38

## 2018-10-05 RX ADMIN — Medication PRN ML: at 15:18

## 2018-10-05 RX ADMIN — ENOXAPARIN SODIUM SCH MG: 40 INJECTION, SOLUTION INTRAVENOUS; SUBCUTANEOUS at 20:46

## 2018-10-05 RX ADMIN — Medication SCH: at 09:32

## 2018-10-05 RX ADMIN — ESCITALOPRAM OXALATE SCH MG: 10 TABLET ORAL at 09:29

## 2018-10-05 RX ADMIN — PANTOPRAZOLE SODIUM SCH MG: 40 INJECTION, POWDER, FOR SOLUTION INTRAVENOUS at 06:06

## 2018-10-05 RX ADMIN — INSULIN LISPRO SCH: 100 INJECTION, SOLUTION INTRAVENOUS; SUBCUTANEOUS at 11:41

## 2018-10-05 RX ADMIN — Medication SCH ML: at 20:46

## 2018-10-05 NOTE — PN
DATE:  10/05/18



SUPERVISING PHYSICIAN:  Roel Moreno M.D.



SUBJECTIVE:  The patient is lying in bed.  She is eating her lunch.  She does 
have short yes/no answers but otherwise does not answer appropriately.  Her 
sitter is at the bedside.  Nursing staff reported no issues overnight.



OBJECTIVE:  VITAL SIGNS: She is afebrile, heart rate 59, blood pressure 137/79, 
respiratory rate 16, O2 sat is 93% on room air.  RESPIRATORY: Essentially clear 
to auscultation bilaterally.  CARDIAC: Slightly bradycardic rate, regular 
rhythm.  GASTROINTESTINAL: Abdomen is soft, nondistended, non-tender.  Bowel 
sounds are positive.  EXTREMITIES: No cyanosis, clubbing or edema.  NEUROLOGIC: 
She is awake.  She is alert.  She does not answer questions appropriately, 
frequently says yes and no inappropriately.  



LABORATORY:  WBCs have improved to 8,200 with hemoglobin 13.7, hematocrit 41.6.
  Electrolytes are basically within normal limits.  BUN has improved to 16, 
creatinine 0.63, calcium is normal at 8.8.  AST is 41, ALT 53, alkaline 
phosphatase 99.  Serum total protein 6.  Serial cardiac enzymes were negative.  
Urine cultures are pending.  All other labs and films have been reviewed via 
the EMR.



ASSESSMENT: 

1.   Systemic inflammatory response with a respiratory rate of 22, 
subtherapeutic

      temperature of 96.4 and heart rate 95 on admission.  Most likely source is

      a urinary tract infection.

2.   Dehydration secondary to #1.

3.   Chest pain.  Serial cardiac enzymes are all negative with no further 
complaints of

      chest pain or any noted EKG changes.

4.   Recent psychosis episode requiring hospitalization at a behavioral health

      center in [place], [place] in August of 2018.

5.   Hypertension, well controlled.

6.   Diabetes mellitus no longer on therapy, diet controlled.

7.   Chronic back pain.

8.   Fatty liver disease.



PLAN:  We will continue present supportive care.  I will hold on lab until 
Sunday at this point.  Due to her inability to care for herself, she will most 
likely be discharged to an assisted living or to a nursing home.  Social 
Services has initiated the paperwork on that.  We will continue her antibiotics 
as ordered.  Monitor her cultures and hopefully she can be discharged in the 
next 1 to 2 days.  Will continue to monitor her and follow as needed.  Dr. Moreno is the collaborating physician available for consultation.



#[id number]
[facility]

## 2018-10-06 RX ADMIN — INSULIN LISPRO SCH: 100 INJECTION, SOLUTION INTRAVENOUS; SUBCUTANEOUS at 20:55

## 2018-10-06 RX ADMIN — INSULIN LISPRO SCH: 100 INJECTION, SOLUTION INTRAVENOUS; SUBCUTANEOUS at 06:59

## 2018-10-06 RX ADMIN — LISINOPRIL SCH MG: 5 TABLET ORAL at 08:40

## 2018-10-06 RX ADMIN — INSULIN LISPRO SCH UNITS: 100 INJECTION, SOLUTION INTRAVENOUS; SUBCUTANEOUS at 11:20

## 2018-10-06 RX ADMIN — CEFTRIAXONE SCH MLS/HR: 1 INJECTION, POWDER, FOR SOLUTION INTRAMUSCULAR; INTRAVENOUS at 15:11

## 2018-10-06 RX ADMIN — Medication SCH ML: at 20:34

## 2018-10-06 RX ADMIN — MEMANTINE HYDROCHLORIDE SCH MG: 10 TABLET ORAL at 20:33

## 2018-10-06 RX ADMIN — PANTOPRAZOLE SODIUM SCH MG: 40 INJECTION, POWDER, FOR SOLUTION INTRAVENOUS at 06:02

## 2018-10-06 RX ADMIN — ESCITALOPRAM OXALATE SCH MG: 10 TABLET ORAL at 08:40

## 2018-10-06 RX ADMIN — INSULIN LISPRO SCH UNITS: 100 INJECTION, SOLUTION INTRAVENOUS; SUBCUTANEOUS at 16:33

## 2018-10-06 RX ADMIN — Medication SCH ML: at 08:40

## 2018-10-06 RX ADMIN — ENOXAPARIN SODIUM SCH MG: 40 INJECTION, SOLUTION INTRAVENOUS; SUBCUTANEOUS at 20:35

## 2018-10-06 NOTE — PN
DATE:  10/06/18



SUPERVISING PHYSICIAN:  Reol Moreno M.D.



SUBJECTIVE:   The patient is sitting up in her chair in her hospital room.  She 
is much more talkative today than she has been in the past.  She has no 
complaints of nausea or vomiting, CVA tenderness or lower abdominal pain.  No 
chest pain or shortness of breath.



OBJECTIVE:  VITAL SIGNS: She is afebrile, heart rate 84.  It has been as high 
as 102.  Blood pressure has been running 160s to 170s systolic and 79 to 87 
diastolic.  Respiratory rate 24, O2 sat 94% on room air.  RESPIRATORY: 
Essentially clear to auscultation bilaterally.  CARDIAC: Regular rate and 
rhythm.  At times she is slightly tachycardic.  GASTROINTESTINAL: Abdomen is 
soft, nondistended, non-tender.  Bowel sounds are positive.  There is no CVA 
tenderness.  NEUROLOGIC: She is awake.  She answers simple yes/no questions 
appropriately.  



LABORATORY:  Blood sugars have run between 91 and 155.  Urine culture is 
pending.  All other labs and films have been reviewed via the EMR.



ASSESSMENT: 

1.   Systemic inflammatory response with a respiratory rate of 22, 
subtherapeutic

      temperature of 96.4 and heart rate 95 on admission.  Most likely source is

      a urinary tract infection.

2.   Dehydration secondary to #1.

3.   Chest pain.  Serial cardiac enzymes are all negative with no further 
complaints of

      chest pain or any noted EKG changes.

4.   Recent psychosis episode requiring hospitalization at a behavioral health

      center in [place], [place] in August of 2018.

5.   Hypertension that has been somewhat elevated

6.   Diabetes mellitus no longer on therapy, diet controlled.

7.   Chronic back pain.

8.   Fatty liver disease.



PLAN:  We will continue present supportive care.  We are awaiting urine 
cultures to guide antibiotic therapy.  I will increase her Lisinopril and 
continue to monitor her blood pressure.  I will hold on labs for now as they 
have been fairly normal as we are awaiting urine cultures.  Again, discharge 
planning is for the assisted living center here in [place] or a nursing home.  
We will continue to follow her as needed.



#[id number]
[facility]

## 2018-10-07 RX ADMIN — ENOXAPARIN SODIUM SCH MG: 40 INJECTION, SOLUTION INTRAVENOUS; SUBCUTANEOUS at 21:13

## 2018-10-07 RX ADMIN — LISINOPRIL SCH MG: 10 TABLET ORAL at 08:43

## 2018-10-07 RX ADMIN — Medication PRN ML: at 06:19

## 2018-10-07 RX ADMIN — INSULIN LISPRO SCH: 100 INJECTION, SOLUTION INTRAVENOUS; SUBCUTANEOUS at 07:53

## 2018-10-07 RX ADMIN — ESCITALOPRAM OXALATE SCH MG: 10 TABLET ORAL at 08:42

## 2018-10-07 RX ADMIN — CEFTRIAXONE SCH MLS/HR: 1 INJECTION, POWDER, FOR SOLUTION INTRAMUSCULAR; INTRAVENOUS at 15:30

## 2018-10-07 RX ADMIN — Medication SCH ML: at 21:14

## 2018-10-07 RX ADMIN — PANTOPRAZOLE SODIUM SCH MG: 40 INJECTION, POWDER, FOR SOLUTION INTRAVENOUS at 06:19

## 2018-10-07 RX ADMIN — INSULIN LISPRO SCH UNITS: 100 INJECTION, SOLUTION INTRAVENOUS; SUBCUTANEOUS at 21:14

## 2018-10-07 RX ADMIN — INSULIN LISPRO SCH: 100 INJECTION, SOLUTION INTRAVENOUS; SUBCUTANEOUS at 17:42

## 2018-10-07 RX ADMIN — MEMANTINE HYDROCHLORIDE SCH MG: 10 TABLET ORAL at 21:13

## 2018-10-07 RX ADMIN — Medication SCH ML: at 08:43

## 2018-10-07 RX ADMIN — INSULIN LISPRO SCH UNITS: 100 INJECTION, SOLUTION INTRAVENOUS; SUBCUTANEOUS at 17:43

## 2018-10-07 NOTE — PN
DATE:  10/07/18



SUPERVISING PHYSICIAN:  Roel Moreno M.D.



SUBJECTIVE:   The patient is lying in bed.  Her head is covered.  She will not 
communicate with anyone today.  She has been in this state all morning.  Her 
sitter is at the bedside.  Nursing reported no issues overnight. 



OBJECTIVE:  VITAL SIGNS: She is afebrile, heart rate 58.  Blood pressure 148/
68.  Respiratory rate 16.  O2 saturation is 92% on room air.  RESPIRATORY: 
Essentially clear to auscultation bilaterally.  CARDIAC: Regular rate and 
rhythm.  GASTROINTESTINAL: Abdomen is soft, nondistended, non-tender.  Bowel 
sounds are positive.  NEUROLOGIC: She is awake.  She will not focus on anyone 
that speaks to her.  She does not answer questions.



LABORATORY:  CBC is within normal limits.  Blood sugars have run between 99 and 
168.  

Her metabolic panel is within normal limits.  Her urine culture is pending.  
All other labs and films have been reviewed via the EMR.



ASSESSMENT: 

1.   Systemic inflammatory response with a respiratory rate of 22, 
subtherapeutic

      temperature of 96.4 and heart rate 95 on admission.  Most likely source is

      a urinary tract infection.

2.   Dehydration secondary to #1, resolved.

3.   Chest pain.  Serial cardiac enzymes are all negative with no further 
complaints of

      chest pain or any noted EKG changes.

4.   Recent psychosis episode requiring hospitalization at a behavioral health

      center in [place], [place] in August of 2018.

5.   Hypertension that after medication adjustments has been stable.

6.   Diabetes mellitus no longer on therapy, diet controlled.

7.   Chronic back pain.

8.   Fatty liver disease.



PLAN:  We will continue present supportive care.  At this point, she could be 
discharged but we are awaiting an evaluation from an assisted living center and 
they will not come out until tomorrow morning.  If she is unable to got the 
assisted living, then we will refer her to a nursing home.  We need to follow 
urine cultures as they become available.  Hopefully, we will have some 
information on that tomorrow.  We will continue her present antibiotics and 
followup as needed.  Dr. Moreno is the collaborating physician available 
for consultation.



#[id number]
[facility]

## 2018-10-08 RX ADMIN — PANTOPRAZOLE SODIUM SCH MG: 40 INJECTION, POWDER, FOR SOLUTION INTRAVENOUS at 06:10

## 2018-10-08 RX ADMIN — ESCITALOPRAM OXALATE SCH MG: 10 TABLET ORAL at 08:59

## 2018-10-08 RX ADMIN — CEFTRIAXONE SCH MLS/HR: 1 INJECTION, POWDER, FOR SOLUTION INTRAMUSCULAR; INTRAVENOUS at 14:56

## 2018-10-08 RX ADMIN — INSULIN LISPRO SCH: 100 INJECTION, SOLUTION INTRAVENOUS; SUBCUTANEOUS at 11:41

## 2018-10-08 RX ADMIN — ENOXAPARIN SODIUM SCH MG: 40 INJECTION, SOLUTION INTRAVENOUS; SUBCUTANEOUS at 21:06

## 2018-10-08 RX ADMIN — Medication SCH ML: at 09:00

## 2018-10-08 RX ADMIN — INSULIN LISPRO SCH: 100 INJECTION, SOLUTION INTRAVENOUS; SUBCUTANEOUS at 21:04

## 2018-10-08 RX ADMIN — Medication SCH ML: at 21:06

## 2018-10-08 RX ADMIN — MEMANTINE HYDROCHLORIDE SCH MG: 10 TABLET ORAL at 21:05

## 2018-10-08 RX ADMIN — Medication PRN ML: at 06:09

## 2018-10-08 RX ADMIN — INSULIN LISPRO SCH UNITS: 100 INJECTION, SOLUTION INTRAVENOUS; SUBCUTANEOUS at 16:25

## 2018-10-08 RX ADMIN — INSULIN LISPRO SCH: 100 INJECTION, SOLUTION INTRAVENOUS; SUBCUTANEOUS at 07:04

## 2018-10-08 RX ADMIN — LISINOPRIL SCH MG: 10 TABLET ORAL at 08:59

## 2018-10-08 NOTE — PN
DATE:  10/08/18



SUPERVISING PHYSICIAN:  Roel Moreno M.D.



SUBJECTIVE:  The patient this morning refuses to communicate.  She has not had 
any reported issues through the night and we are just waiting on arrangements 
for the patient to be transferred and discharged or admitted to either a memory 
unit or [facility].  She has had no complaints of shortness of breath, 
nausea, vomiting or diarrhea. 



OBJECTIVE:  VITAL SIGNS: She remains afebrile, temperature 98.2, pulse 58, 
blood pressure 127/77, respirations 16, satting 96% on room air.  I's and O's 
are not well noted as she has been incontinent and refuses to utilize the hats.
  Weight is showing to be 65.8 kg.  GENERAL: The patient refuses to answer any 
questions.  LUNGS: Clear to auscultation.  HEART: Regular rate and rhythm.  
ABDOMEN: Soft, non-tender.  Positive bowel sounds.  NEUROLOGIC: The patient is 
alert but will not answer any questions as far as place, person and time, or 
any other directed questions.  Her sitter at the bedside notes that she appears 
to be at her normal baseline mental status.



LABORATORY:  No additional laboratories were repeated today.  Her blood sugars 
have ranged between 99 and 236.



MICROBIOLOGY:  Final culture results at 36 hours showed no growth.  No 
additional radiographic studies were submitted today.



ASSESSMENT: 

1.   Systemic inflammatory response likely due to underlying urinary tract 
infection

      with the patient showing good response to antibiotic treatments.

2.   Urinary tract infection as noted on admission with symptom pyuria but 
cultures

      showing to be without any growth at 36 hours with the patient responding 
well

      to treatment with antibiotic therapy to include Rocephin.

3.   Dehydration secondary to #1, resolved.

4.   Chest pains with no evidence of acute injury with cardiac serial enzymes 
and

      EKGs being without any acute changes with the patient having no further

      complaints of chest pains, uncertain etiology.

5.   Recent psychosis episode requiring hospitalization at a behavioral health center in [place], [place] in August of 2018.

6.   Hypertension showing to be stable.

7.   Diabetes mellitus, diet controlled, stable.

8.   Chronic back pain.

9.   Fatty liver disease.

10. Questionable history of dementia with the patient being on Abilify.



PLAN:  Will await final disposition from either [facility], the memory unit, or 
[facility] with anticipation of discharging either later today or tomorrow 
morning.  She is unable to go to assisted living, will need to go to [facility] per Dr. Harry mccabe, her primary care provider.  Her urine cultures 
have been negative.  Given the final results of that along with her response to 
antibiotics, will continue with a short course of antibiotics at discharge.  
Until discharge will continue to monitor and treat appropriately.



#[id number]
MTDD

## 2018-10-09 VITALS — TEMPERATURE: 98 F | OXYGEN SATURATION: 98 % | SYSTOLIC BLOOD PRESSURE: 125 MMHG | DIASTOLIC BLOOD PRESSURE: 75 MMHG

## 2018-10-09 RX ADMIN — INSULIN LISPRO SCH: 100 INJECTION, SOLUTION INTRAVENOUS; SUBCUTANEOUS at 11:40

## 2018-10-09 RX ADMIN — INSULIN LISPRO SCH: 100 INJECTION, SOLUTION INTRAVENOUS; SUBCUTANEOUS at 07:34

## 2018-10-09 RX ADMIN — LISINOPRIL SCH MG: 10 TABLET ORAL at 09:02

## 2018-10-09 RX ADMIN — INSULIN LISPRO SCH: 100 INJECTION, SOLUTION INTRAVENOUS; SUBCUTANEOUS at 16:24

## 2018-10-09 RX ADMIN — Medication SCH ML: at 08:56

## 2018-10-09 RX ADMIN — ESCITALOPRAM OXALATE SCH MG: 10 TABLET ORAL at 09:02

## 2018-10-14 NOTE — DS
SUPERVISING PHYSICIAN:  Roel Moreno M.D.



ADMISSION DIAGNOSIS:

1.   Systemic inflammatory response likely due to urinary tract infection.

2.   Dehydration secondary to #1.

3.   Chest pain with initial cardiac enzymes negative and no further complaints 
of

      chest pains or EKG changes.

4.   Recent psychosis episode requiring hospitalization to behavioral health center

      in [place], [place] in 2018 in August.

5.   Hypertension well controlled.

6.   Diabetes mellitus not longer on therapy and diet controlled.

7.   Chronic back pain.

8.   Fatty liver disease.



DISCHARGE DIAGNOSIS: 

1.   Systemic inflammatory response due to underlying urinary tract infection

      with the patient showing good response to antibiotics.

2.   Urinary tract infection cystitis with the patient having a significant 
pyuria but

      cultures showing to be without any growth in 36 hours and responding well

      to antibiotic therapy, including Rocephin.de Rocephin.

3.   Dehydration secondary to #1, resolved.

4.   Chest pains with no evidence of acute injury with cardiac serial enzymes 
and

      EKGs being without any acute changes with the patient having no further

      complaints of chest pains, uncertain etiology.

5.   Recent psychosis episode requiring hospitalization at a behavioral health center in [place], [place] in August of 2018.

6.   Hypertension showing to be stable.

7.   Diabetes mellitus, diet controlled, stable.

8.   Chronic back pain.

9.   Fatty liver disease.

10. Questionable history of dementia with the patient being on Abilify.



REASON FOR HOSPITALIZATION:  Ms. Bales is a 68 year-old female patient who 
had a recent history of psychosis.  Her  had passed away several months 
previously and she lives alone at home.  In August, she was brought to the 
hospital for an almost obtunded state and was actually sent to [facility] in [place].  The son lives in [place] and we have no access to 
any medical records or history from that actual visit, but in the last 24 hours 
caretakers presented her to the Emergency Room due to complaint of chest pain 
for about 1 hour prior to arrival.  Her caretaker said she was having 
difficulty breathing and was grasping her chest.  The patient said that she was 
having chest pain.  The caretaker got up to assist her and the patient actually 
slumped over for a few seconds.  Previous to this episode, she had called for 
help and about that time someone came in.  They assisted the patient.  EMS had 
already been contacted and she was brought to the Emergency Room.  In the E. R.
, her temperature was noted to be 96.4, pulse 95, blood pressure 125/81, 
satting 94% on room air.  Labs were drawn and white count was  11,900.  Cardiac 
troponin was less than 0.02.  EKG showed sinus rhythm.  Urinalysis showed 
positive for a urinary tract infection with 100 protein, 40 ketones, small 
amount of leukocyte esterase, and 20 to 30 urine WBCs.  She was given some 
fluid in the E. R., started on Rocephin and then admitted to the hospital in 
stable condition.



LABORATORY STUDIES:  CBC on admission showed white count 11,900, prior to 
discharge normalized and was within normal limits with platelet count 219,000.  
Differential was without a left shift.  She had normal PT and PTT.  Chemistries 
on admission showed normal electrolytes with an elevated BUN of 23, creatinine 
1.0, calcium 10.5 with total bilirubin 1.4, AST 76, ALT 88, alkaline 
phosphatase 146.  Initial troponin was less than 0.02.  She had 2 separate 
troponins collected which were all within normal limits.  TSH was normal at 0.35
, T4 normal at 10.28.  Prior to discharge liver functions all had normalized.  
Electrolytes were normal.  BUN 16, creatinine 0.63.



MICROBIOLOGY:  Final urine culture results showed no growth at 36 hours.



RADIOLOGY:  She had a CT of the head in the E. R. without contrast and per 
radiology interpretation no acute findings.



HOSPITAL COURSE:  Ms. Bales was admitted on 10/04/18 for dehydration, urinary 
tract infection and acute mental status change with chest pains to rule out 
acute myocardial infarction.  She was started on antibiotics with Rocephin.  
She was given fluids and had a full cardiac workup with no acute changes noted.
  She was showing some clinical improvement, although she was showing to be 
very withdrawn through the whole hospitalization period.  Vital signs showed to 
be stable, at discharge temperature was 98, pulse 64, blood pressure 125/75, 
respirations 18.  She was satting 98% on room air.  On 10/09/18, she was felt 
to be clinically well enough to discharge and had been accepted into a local 
nursing home through [facility] for continued management.  



PLAN:   She is discharged to [facility] and was to followup with Dr. Mcdonald in 7 
to 10 days.  She is to resume her home medications as instructed and take new 
medications as directed.  She is to return to the hospital should she have any 
concerning symptoms.  At discharge, diet was diabetic diet as tolerated.  
Activity is as tolerated.  New medications at discharge included:



1.   Ceftin 500 mg every 12 hours for 5 days, #10.

2.   Lisinopril 10 mg daily, #30.

3.   Xanax 0.25 t.i.d. as needed for anxiety, #15.



Condition at discharge was stable and improved.



#[id number]
[facility]

## 2018-11-13 ENCOUNTER — HOSPITAL ENCOUNTER (OUTPATIENT)
Dept: HOSPITAL 39 - YCHH | Age: 68
End: 2018-11-13
Attending: FAMILY MEDICINE
Payer: MEDICARE

## 2018-11-13 DIAGNOSIS — E11.9: Primary | ICD-10-CM

## 2018-12-05 ENCOUNTER — HOSPITAL ENCOUNTER (OUTPATIENT)
Dept: HOSPITAL 39 - YCHH | Age: 68
End: 2018-12-05
Attending: FAMILY MEDICINE
Payer: MEDICARE

## 2018-12-05 DIAGNOSIS — N39.0: ICD-10-CM

## 2018-12-05 DIAGNOSIS — D64.9: ICD-10-CM

## 2018-12-05 DIAGNOSIS — E78.5: ICD-10-CM

## 2018-12-05 DIAGNOSIS — E11.9: Primary | ICD-10-CM

## 2018-12-31 ENCOUNTER — HOSPITAL ENCOUNTER (EMERGENCY)
Dept: HOSPITAL 39 - ER | Age: 68
Discharge: HOME | End: 2018-12-31
Payer: MEDICARE

## 2018-12-31 VITALS — OXYGEN SATURATION: 95 % | SYSTOLIC BLOOD PRESSURE: 177 MMHG | DIASTOLIC BLOOD PRESSURE: 82 MMHG

## 2018-12-31 VITALS — TEMPERATURE: 99.7 F

## 2018-12-31 DIAGNOSIS — Z79.899: ICD-10-CM

## 2018-12-31 DIAGNOSIS — I10: ICD-10-CM

## 2018-12-31 DIAGNOSIS — Z85.3: ICD-10-CM

## 2018-12-31 DIAGNOSIS — Z88.8: ICD-10-CM

## 2018-12-31 DIAGNOSIS — E11.9: ICD-10-CM

## 2018-12-31 DIAGNOSIS — F03.90: ICD-10-CM

## 2018-12-31 DIAGNOSIS — K21.9: ICD-10-CM

## 2018-12-31 DIAGNOSIS — K52.9: Primary | ICD-10-CM

## 2018-12-31 DIAGNOSIS — J44.9: ICD-10-CM

## 2018-12-31 PROCEDURE — 83690 ASSAY OF LIPASE: CPT

## 2018-12-31 PROCEDURE — 93005 ELECTROCARDIOGRAM TRACING: CPT

## 2018-12-31 PROCEDURE — 36415 COLL VENOUS BLD VENIPUNCTURE: CPT

## 2018-12-31 PROCEDURE — 84484 ASSAY OF TROPONIN QUANT: CPT

## 2018-12-31 PROCEDURE — 82550 ASSAY OF CK (CPK): CPT

## 2018-12-31 PROCEDURE — 80053 COMPREHEN METABOLIC PANEL: CPT

## 2018-12-31 PROCEDURE — 82553 CREATINE MB FRACTION: CPT

## 2018-12-31 PROCEDURE — 74177 CT ABD & PELVIS W/CONTRAST: CPT

## 2018-12-31 PROCEDURE — 83880 ASSAY OF NATRIURETIC PEPTIDE: CPT

## 2018-12-31 PROCEDURE — 85025 COMPLETE CBC W/AUTO DIFF WBC: CPT

## 2018-12-31 PROCEDURE — 81001 URINALYSIS AUTO W/SCOPE: CPT

## 2018-12-31 NOTE — CT
EXAM DESCRIPTION: 

Abdomen/Pelvis w/Contrast: Computed Tomography.



CLINICAL HISTORY: 

68 years Female abdominal pain



COMPARISON: 

CT scan of the chest abdomen and pelvis 8/4/2018.



TECHNIQUE: 

Spiral-axial scans at 5 x 5 mm intervals through the abdomen and

pelvis, after nonionic IV contrast without oral contrast. 

Coronal and sagittal 2.0 mm reconstructions. Delayed scans, liver

through the pelvis.   Axial-spiral 5mm. No adverse reactions. 

Total Exam DLP: 50 mGy-cm.  This exam was performed according to

our departmental dose-optimization program which includes

automated exposure control, adjustment of the mA and/or kV

according to patient size and/or use of iterative reconstruction

technique; to reduce radiation dose to as low as reasonably

achievable (ALARA).



FINDINGS: 

Lung bases and pleura: Bilateral atelectasis with no pleural

effusion.

Liver, Stomach, Spleen, Adrenal Glands: Small hiatal hernia.

Otherwise Negative.

Pancreas, Gallbladder, Ducts: Surgical clips in the gallbladder

fossa. Minimal dilation of the common bile duct, caliber within

normal range postcholecystectomy.

Kidneys and Ureters: Unremarkable.

Mesentery: Negative.

Aorta: Enlarged mid abdominal aorta 2.0 x 1.9 cm with

atherosclerotic calcification outer wall and intimal and medial

wall thickening with true lumen approximately 1 cm diameter.

Luminal narrowing at the origin of the renal arteries. Moderate

atherosclerotic calcification and intimal wall thickening

proximally and distally. Approximately 50% diameter stenosis of

the proximal right common iliac artery.

Small Bowel: Proximal jejunum is in the right upper quadrant. No

obstruction. No gas-filled distended loops or free air.

Terminal Ileum/Cecum: Negative. Appendix not seen.

Colon: Intermittent distention by gas and fecal material in the

transverse colon and proximal descending colon. Distal descending

colon and rectosigmoid distended by gas and fecal material.

Pelvic Organs: Urinary bladder unremarkable. Vaginal cuff is

negative. Cystlike object to the left of the midline vagina

stable since the prior study. No fluid in the cul-de-sac.

Spine and Bony Pelvis: Lumbar scoliosis. Old compression fracture

T11 and T12 with minimal retropulsion superior endplate T12 and

canal and foraminal narrowing. Overall bone density loss.

Hypertrophic changes bilateral acetabula with over coverage.

Arthrosis SI joints on the right more than left.

Abdominal Wall/Back Soft Tissues: No hernias. Bilateral breast

implants.



IMPRESSION: 

1. No bowel obstruction. No free air. Distention of the redundant

sigmoid and distal descending colon by gas and fecal material. No

ascites.

2. Moderate atherosclerotic disease of the abdominal aorta and

proximal iliac vessels stable since the prior study.

3. Stable cystlike structure to the left of midline in the

vagina.

4. Compression fractures of T11 and T12 vertebral bodies are

stable since the prior study.



Electronically signed by:  Jamari Colindres MD  12/31/2018 1:49 PM

CST Workstation: [id number]

## 2018-12-31 NOTE — ED.PDOC
History of Present Illness





- General


Chief Complaint: GI Problem


Stated Complaint: nausea and vomiting


Time Seen by Provider: 12/31/18 10:39


Source: EMS, other - assisted living employees


Exam Limitations: other - dementia





- History of Present Illness


Initial Comments: 





Per EMS and assisted living staff, patient has had N/D/D for three days.  She 

denies abdominal pain.  She has had multiple similarly sick contacts. No other 

information is available. 


Timing/Duration: other - 3 days


Severity: moderate


Improving Factors: nothing


Worsening Factors: nothing


Associated Symptoms: other - as in HPI


Allergies/Adverse Reactions: 


Allergies





Hydrocodone Allergy (Verified 12/31/18 10:41)


   


Mirtazapine Allergy (Verified 12/31/18 10:41)


   








Home Medications: 


Ambulatory Orders





Escitalopram [Lexapro] 10 mg PO DAILY 10/04/18 


Memantine [Namenda] 5 mg PO BEDTIME 10/04/18 


ALPRAZolam [Xanax] 0.5 mg PO Q8HRS #30 tab 10/09/18 


Fludrocortisone Acetate 0.1 mg PO DAILY 12/31/18 


Omeprazole Magnesium [Prilosec Otc] 40 mg PO DAILY 12/31/18 


Ondansetron HCl [Zofran] 4 mg PO Q6HRS #10 ml 12/31/18 











Review of Systems





- Review of Systems


Constitutional: States: no symptoms reported


EENTM: States: no symptoms reported


Respiratory: States: no symptoms reported


Cardiology: States: no symptoms reported


Gastrointestinal/Abdominal: States: see HPI


Genitourinary: States: no symptoms reported


Musculoskeletal: States: no symptoms reported


Skin: States: no symptoms reported


Neurological: States: no symptoms reported


Endocrine: States: no symptoms reported


Hematologic/Lymphatic: States: no symptoms reported





Past Medical History (General)





- Patient Medical History


Hx Seizures: No


Hx Stroke: No


Hx Dementia: Yes


Hx Asthma: No


Hx of COPD: Yes


Hx Cardiac Disorders: No


Hx Congestive Heart Failure: No


Hx Pacemaker: No


Hx Hypertension: Yes


Hx Thyroid Disease: No


Hx Diabetes: Yes


Hx Gastroesophageal Reflux: Yes


Hx Renal Disease: No


Hx Cancer: Yes - breast


Hx of HIV: No


Hx Hepatitis C: No


Hx MRSA: No





- Vaccination History


Hx Tetanus, Diphtheria Vaccination: No


Hx Influenza Vaccination: No


Hx Pneumococcal Vaccination:  - unknown





- Social History


Hx Tobacco Use: No


Hx Alcohol Use: No


Hx Substance Use: No


Hx Substance Use Treatment: No


Hx Depression: No





- Activities of Daily Living


Nursing Home/Assisted Living (if applicable):: [facility]





- Female History


Patient Pregnant: No





Family Medical History





- Family History


  ** Mother


Family History: Unknown





Physical Exam





- Physical Exam


General Appearance: Anxious


Eye Exam: right normal, right abnormal pupil - almost blind, no pupillary 

reflex, appears to be s/p cateract surgery, retina is visible, clear fluid in 

anterior chamber


Ears, Nose, Throat: hearing grossly normal, normal ENT inspection, normal 

pharynx


Neck: non-tender, full range of motion, supple


Respiratory: lungs clear, normal breath sounds


Cardiovascular/Chest: normal peripheral pulses, regular rate, rhythm, no edema


Gastrointestinal/Abdominal: normal bowel sounds, soft, tenderness - TTP at 

umbilicus as determined by patient moaning with palpatin at that area. Negative 

Rovsing's sign. McBurney's point is not tender to palpation.


Back Exam: no CVA tenderness, no vertebral tenderness


Extremity: normal range of motion, non-tender, normal inspection


Neurologic: CNs II-XII nml as tested - except there is no right pupil reflex, no

 motor/sensory deficits - see above, alert, other - alert to name only, has 

Alzheimer's dementia


Skin Exam: normal color


Lymphatic: no adenopathy





Progress





- Progress


Progress: 





12/31/18 14:27


                                Laboratory Tests











  12/31/18 12/31/18 12/31/18





  10:50 10:50 10:50


 


WBC   7.2 


 


RBC   4.49 


 


Hgb   14.0 


 


Hct   41.6 


 


MCV   92.6 


 


MCH   31.2 H 


 


MCHC   33.7 


 


RDW   14.1 


 


Plt Count   234 


 


MPV   7.5 


 


Absolute Neuts (auto)   5.50 


 


Absolute Lymphs (auto)   1.00 


 


Absolute Monos (auto)   0.70 


 


Absolute Eos (auto)   0.00 


 


Absolute Basos (auto)   0.00 


 


Neutrophils %   76.1 


 


Lymphocytes %   14.3 L 


 


Monocytes %   9.4 H 


 


Eosinophils %   0.0 L 


 


Basophils %   0.2 


 


Sodium  139  


 


Potassium  3.0 L  


 


Chloride  104  


 


Carbon Dioxide  23  


 


Anion Gap  15.0  


 


BUN  23 H  


 


Creatinine  0.73  


 


BUN/Creatinine Ratio  31.5 H  


 


Random Glucose  142 H  


 


Serum Osmolality  283.6  


 


Calcium  9.4  


 


Total Bilirubin  1.1 H  


 


AST  25  


 


ALT  13  


 


Alkaline Phosphatase  76  


 


Creatine Kinase   


 


CK-MB (CK-2)   


 


CK-MB (CK-2) %   


 


Troponin I   


 


B-Natriuretic Peptide   


 


Serum Total Protein  6.7  


 


Albumin  3.5  


 


Globulin  3.2  


 


Albumin/Globulin Ratio  1.1  


 


Lipase    16 L


 


Urine Color   


 


Urine Appearance   


 


Urine pH   


 


Ur Specific Gravity   


 


Urine Protein   


 


Urine Glucose (UA)   


 


Urine Ketones   


 


Urine Blood   


 


Urine Nitrite   


 


Urine Bilirubin   


 


Urine Urobilinogen   


 


Ur Leukocyte Esterase   


 


Urine RBC   


 


Urine WBC   


 


Ur Epithelial Cells   


 


Urine Bacteria   














  12/31/18 12/31/18





  10:50 11:46


 


WBC  


 


RBC  


 


Hgb  


 


Hct  


 


MCV  


 


MCH  


 


MCHC  


 


RDW  


 


Plt Count  


 


MPV  


 


Absolute Neuts (auto)  


 


Absolute Lymphs (auto)  


 


Absolute Monos (auto)  


 


Absolute Eos (auto)  


 


Absolute Basos (auto)  


 


Neutrophils %  


 


Lymphocytes %  


 


Monocytes %  


 


Eosinophils %  


 


Basophils %  


 


Sodium  


 


Potassium  


 


Chloride  


 


Carbon Dioxide  


 


Anion Gap  


 


BUN  


 


Creatinine  


 


BUN/Creatinine Ratio  


 


Random Glucose  


 


Serum Osmolality  


 


Calcium  


 


Total Bilirubin  


 


AST  


 


ALT  


 


Alkaline Phosphatase  


 


Creatine Kinase  101 


 


CK-MB (CK-2)  1.5 


 


CK-MB (CK-2) %  Not Reportable 


 


Troponin I  0.03 


 


B-Natriuretic Peptide  261.0 H* 


 


Serum Total Protein  


 


Albumin  


 


Globulin  


 


Albumin/Globulin Ratio  


 


Lipase  


 


Urine Color   Yellow


 


Urine Appearance   Clear


 


Urine pH   5.5


 


Ur Specific Gravity   >= 1.030


 


Urine Protein   30


 


Urine Glucose (UA)   Negative


 


Urine Ketones   80 H


 


Urine Blood   Negative


 


Urine Nitrite   Negative


 


Urine Bilirubin   Small H


 


Urine Urobilinogen   1.0


 


Ur Leukocyte Esterase   Negative


 


Urine RBC   0


 


Urine WBC   0-1


 


Ur Epithelial Cells   0


 


Urine Bacteria   0








Potassium 3.0. Patient given NS one liter IV with 20 meq KCl over two hours. CT 

showed no acute disease . Patient was asymptomatic in the E.D. Likely viral 

gastroenteritis given her numerous contacts with similar symptoms. Care 

instructions and RX for Zofran provided. E.R. warnings given. 





Departure





- Departure


Clinical Impression: 


 Gastroenteritis





Disposition: Discharge to Home or Self Care


Condition: Good


Departure Forms:  ED Discharge - Pt. Copy, Patient Portal Self Enrollment


Instructions:  Viral Gastroenteritis


Diet: resume usual diet, other - Increase fluids


Activity: increase activity as tolerated


Referrals: 


Bud Mcdonald MD [Primary Care Provider] - 1-2 Weeks


Prescriptions: 


Ondansetron HCl [Zofran] 4 mg PO Q6HRS #10 ml


Home Medications: 


Ambulatory Orders





Escitalopram [Lexapro] 10 mg PO DAILY 10/04/18 


Memantine [Namenda] 5 mg PO BEDTIME 10/04/18 


ALPRAZolam [Xanax] 0.5 mg PO Q8HRS #30 tab 10/09/18 


Fludrocortisone Acetate 0.1 mg PO DAILY 12/31/18 


Omeprazole Magnesium [Prilosec Otc] 40 mg PO DAILY 12/31/18 


Ondansetron HCl [Zofran] 4 mg PO Q6HRS #10 ml 12/31/18 








Additional Instructions: 


Increase oral fluids. Take prescriptions as directed.  Return to the E.R. for 

temperature above 100.4 or if symptoms fail to resolve in 24 hours.

## 2019-01-01 ENCOUNTER — HOSPITAL ENCOUNTER (EMERGENCY)
Dept: HOSPITAL 39 - ER | Age: 69
Discharge: NURSING, REHAB OR LONG-TERM CARE | End: 2019-01-01
Payer: MEDICARE

## 2019-01-01 VITALS — SYSTOLIC BLOOD PRESSURE: 101 MMHG | OXYGEN SATURATION: 94 % | DIASTOLIC BLOOD PRESSURE: 56 MMHG

## 2019-01-01 VITALS — TEMPERATURE: 99.2 F

## 2019-01-01 DIAGNOSIS — Z88.5: ICD-10-CM

## 2019-01-01 DIAGNOSIS — E11.9: ICD-10-CM

## 2019-01-01 DIAGNOSIS — Z85.3: ICD-10-CM

## 2019-01-01 DIAGNOSIS — Z88.8: ICD-10-CM

## 2019-01-01 DIAGNOSIS — K21.9: ICD-10-CM

## 2019-01-01 DIAGNOSIS — Z79.899: ICD-10-CM

## 2019-01-01 DIAGNOSIS — I10: ICD-10-CM

## 2019-01-01 DIAGNOSIS — F03.91: Primary | ICD-10-CM

## 2019-01-01 DIAGNOSIS — J44.9: ICD-10-CM

## 2019-01-01 NOTE — ED.PDOC
History of Present Illness





- General


Chief Complaint: Behavioral / Psych


Stated Complaint: violent behavior


Time Seen by Provider: 01/01/19 10:15


Source: patient


Exam Limitations: no limitations





- History of Present Illness


Initial Comments: 





the patient is a 68-year-old  female sent from the nursing home 

secondary to aggressive behavior.  The patient was seen here yesterday due to 

was most likely a viral gastroenteritis.  She had a fairly extensive workup 

including lab work and a CT scan.  She was found to have some mild hypokalemia 

additionally.  This was treated here.  She was given a dose of Zofran for nausea

and vomiting and written for that at the nursing home for as needed use.  Today 

the patient has been increasingly anxious and has apparently had staph couple of

times which is unusual for her.  The patient is alert.  She is obviously 

demented.  She is fairly anxious and talking quickly.  She almost appears mildly

hypomanic.  She does not appear to be in any distress.  She moves all 

extremities well.  Except for her chronic right eye changes there are no obvious

neurological deficits. She is pleasant at this time.  She has not tried to hit 

staff here.the patient had a head CT and MRI here 2 and 3 months ago showing no 

acute or subacute intracranial changes.  She does have long-standing progressive

dementia that is most likely of the Lewy body or frontotemporal type.


Timing/Duration: unsure


Severity: moderate


Improving Factors: nothing


Worsening Factors: nothing


Associated Symptoms: denies symptoms


Allergies/Adverse Reactions: 


Allergies





Hydrocodone Allergy (Verified 01/01/19 10:29)


   


Mirtazapine Allergy (Verified 01/01/19 10:29)


   








Home Medications: 


Ambulatory Orders





Escitalopram [Lexapro] 10 mg PO DAILY 10/04/18 


Memantine [Namenda] 5 mg PO BEDTIME 10/04/18 


ALPRAZolam [Xanax] 0.5 mg PO Q8HRS #30 tab 10/09/18 


Fludrocortisone Acetate 0.1 mg PO DAILY 12/31/18 


Omeprazole Magnesium [Prilosec Otc] 40 mg PO DAILY 12/31/18 


Ondansetron HCl [Zofran] 4 mg PO Q6HRS #10 ml 12/31/18 











Review of Systems





- Review of Systems


Constitutional: States: no symptoms reported


EENTM: States: no symptoms reported


Respiratory: States: no symptoms reported


Cardiology: States: no symptoms reported


Gastrointestinal/Abdominal: States: no symptoms reported


Genitourinary: States: no symptoms reported


Musculoskeletal: States: no symptoms reported


Skin: States: no symptoms reported


Neurological: States: anxiety


Endocrine: States: no symptoms reported


All other Systems: No Change from Baseline





Past Medical History (General)





- Patient Medical History


Hx Seizures: No


Hx Stroke: No


Hx Dementia: Yes


Hx Asthma: No


Hx of COPD: Yes


Hx Cardiac Disorders: No


Hx Congestive Heart Failure: No


Hx Pacemaker: No


Hx Hypertension: Yes


Hx Thyroid Disease: No


Hx Diabetes: Yes


Hx Gastroesophageal Reflux: Yes


Hx Renal Disease: No


Hx Cancer: Yes - breast


Hx of HIV: No


Hx Hepatitis C: No


Hx MRSA: No





- Vaccination History


Hx Tetanus, Diphtheria Vaccination: No


Hx Influenza Vaccination: No


Hx Pneumococcal Vaccination:  - unknown





- Social History


Hx Tobacco Use: No


Hx Alcohol Use: No


Hx Substance Use: No


Hx Substance Use Treatment: No


Hx Depression: No





- Activities of Daily Living


Nursing Home/Assisted Living (if applicable):: [facility]





- Female History


Patient Pregnant: No





Family Medical History





- Family History


  ** Mother


Family History: Unknown





Physical Exam





- Physical Exam


General Appearance: Alert, Anxious - mildly hypomanic


Eye Exam: bilateral normal


Ears, Nose, Throat: hearing grossly normal, normal ENT inspection


Neck: full range of motion, supple


Respiratory: lungs clear, normal breath sounds, no respiratory distress, no 

accessory muscle use


Cardiovascular/Chest: normal peripheral pulses, regular rate, rhythm, no edema


Peripheral Pulses: radial,right: 2+, radial,left: 2+, dorsalis pedis,right: 2+, 

dorsalis pedis,left: 2+


Gastrointestinal/Abdominal: non tender, soft


Rectal Exam: deferred


Back Exam: normal inspection


Extremity: non-tender, normal inspection, no pedal edema, normal capillary 

refill


Neurologic: CNs II-XII nml as tested, alert, other - she is anxious.  She knows 

who she is and that she is at a hospital  This is apparently about as good as 

she ever gets.


Comments: 





                               Vital Signs - 24 hr











  01/01/19





  10:24


 


Temperature 99.2 F


 


Pulse Rate [ 76





pulse ox] 


 


Respiratory 20





Rate 


 


Blood Pressure 126/58





[Left Arm] 


 


O2 Sat by Pulse 96





Oximetry 














Progress





- Progress


Progress: 





01/01/19 11:28


the patient is a 68-year-old  female presenting to the emergency room 

secondary to agitation/aggression and almost a mild hypomanic state.  The 

patient does have long-standing dementia.  She has recently had what appears to 

be a viral gastroenteritis.  no new objective neurological findings otherwise.  

The agitation may be partially contributed to by the nausea medications.  It is 

also possible that the gastroenteritis has prevented adequate absorption of her 

normal dementia and anxiety medications.  She does appear well hydrated.  She is

in no acute distress at this time.  The patient has been given a small dose of 

Risperdal and I'm going to actually write her for 2 weeks of low-dose Risperdal 

to be taken daily.  At the end of the 2 weeks I do recommend that she be taken 

off as a trial and see how she does. she seems to have tolerated the first dose 

well here but she does need to be followed closely at the nursing home to make 

sure she is not having any obvious negative effects from the Risperdal.  

Additionally I going to write her for 1 mg Xanax tablets to be used every 6 

hours only as needed for severe agitation.  She does have scheduled lower dose 

Xanax.  She does need to see the facility doctor in the next few days.  ER 

warnings were given for any significant worsening.  She also does need to have a

repeat potassium check in 1-2 weeks.





Departure





- Departure


Clinical Impression: 


 Dementia with aggressive behavior





Disposition: Discharge to SNF


Condition: Fair


Departure Forms:  ED Discharge - Pt. Copy, Patient Portal Self Enrollment


Instructions:  Dementia (DC)


Diet: regular diet


Activity: increase activity as tolerated


Referrals: 


Bud Mcdonald MD [Primary Care Provider] - 1-5 Days


Home Medications: 


Ambulatory Orders





Escitalopram [Lexapro] 10 mg PO DAILY 10/04/18 


Memantine [Namenda] 5 mg PO BEDTIME 10/04/18 


ALPRAZolam [Xanax] 0.5 mg PO Q8HRS #30 tab 10/09/18 


Fludrocortisone Acetate 0.1 mg PO DAILY 12/31/18 


Omeprazole Magnesium [Prilosec Otc] 40 mg PO DAILY 12/31/18 


Ondansetron HCl [Zofran] 4 mg PO Q6HRS #10 ml 12/31/18 








Additional Instructions: 


the patient is a 68-year-old  female presenting to the emergency room 

secondary to agitation/aggression and almost a mild hypomanic state.  The patie

nt does have long-standing dementia.  She has recently had what appears to be a 

viral gastroenteritis.  no new objective neurological findings otherwise.  The 

agitation may be partially contributed to by the nausea medications.  It is also

 possible that the gastroenteritis has prevented adequate absorption of her 

normal dementia and anxiety medications.  She does appear well hydrated.  She is

 in no acute distress at this time.  The patient has been given a small dose of 

Risperdal and I'm going to actually write her for 2 weeks of low-dose Risperdal 

to be taken daily.  At the end of the 2 weeks I do recommend that she be taken 

off as a trial and see how she does. she seems to have tolerated the first dose 

well here but she does need to be followed closely at the nursing home to make 

sure she is not having any obvious negative effects from the Risperdal.  

Additionally I going to write her for 1 mg Xanax tablets to be used every 6 

hours only as needed for severe agitation.  She does have scheduled lower dose 

Xanax.  She does need to see the facility doctor in the next few days.  ER 

warnings were given for any significant worsening.  She also does need to have a

 repeat potassium check in 1-2 weeks.

## 2019-02-09 ENCOUNTER — HOSPITAL ENCOUNTER (OUTPATIENT)
Dept: HOSPITAL 39 - ER | Age: 69
Setting detail: OBSERVATION
LOS: 1 days | Discharge: NURSING, REHAB OR LONG-TERM CARE | End: 2019-02-10
Attending: NURSE PRACTITIONER | Admitting: FAMILY MEDICINE
Payer: MEDICARE

## 2019-02-09 DIAGNOSIS — Z88.8: ICD-10-CM

## 2019-02-09 DIAGNOSIS — Z85.3: ICD-10-CM

## 2019-02-09 DIAGNOSIS — J44.9: ICD-10-CM
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
## 2019-02-09 DIAGNOSIS — F03.90: ICD-10-CM

## 2019-02-09 DIAGNOSIS — Z88.5: ICD-10-CM

## 2019-02-09 DIAGNOSIS — M48.54XA: ICD-10-CM

## 2019-02-09 DIAGNOSIS — K21.9: ICD-10-CM

## 2019-02-09 DIAGNOSIS — I10: ICD-10-CM

## 2019-02-09 DIAGNOSIS — E11.9: ICD-10-CM

## 2019-02-09 DIAGNOSIS — S72.114A: Primary | ICD-10-CM

## 2019-02-09 DIAGNOSIS — Z79.899: ICD-10-CM

## 2019-02-09 PROCEDURE — 73502 X-RAY EXAM HIP UNI 2-3 VIEWS: CPT

## 2019-02-09 PROCEDURE — 80048 BASIC METABOLIC PNL TOTAL CA: CPT

## 2019-02-09 PROCEDURE — 72192 CT PELVIS W/O DYE: CPT

## 2019-02-09 PROCEDURE — 96372 THER/PROPH/DIAG INJ SC/IM: CPT

## 2019-02-09 PROCEDURE — 99285 EMERGENCY DEPT VISIT HI MDM: CPT

## 2019-02-09 PROCEDURE — 72100 X-RAY EXAM L-S SPINE 2/3 VWS: CPT

## 2019-02-09 PROCEDURE — 73551 X-RAY EXAM OF FEMUR 1: CPT

## 2019-02-09 PROCEDURE — 72070 X-RAY EXAM THORAC SPINE 2VWS: CPT

## 2019-02-09 PROCEDURE — 36415 COLL VENOUS BLD VENIPUNCTURE: CPT

## 2019-02-09 PROCEDURE — 72170 X-RAY EXAM OF PELVIS: CPT

## 2019-02-09 NOTE — RAD
EXAM DESCRIPTION: 



Pelvis



CLINICAL HISTORY: 



fall with pain   



COMPARISON: 



None



FINDINGS: 



Frontal x-ray view of the pelvis was submitted. Patient is

rotated. There is atherosclerosis. Calcifications within the

pelvis may represent phlebolith. Evaluation of the left pubic

rami is limited by degree of rotation. Lucency at the right

greater trochanter worrisome for a nondisplaced fracture.

Correlation with a CT recommended.. 



IMPRESSION: 



Lucency at the right greater trochanter worrisome for a

nondisplaced fracture. Correlation with a CT recommended.



Evaluation of the left pubic rami is limited by degree of

rotation. 



Electronically signed by:  Juventino Bingham MD  2/9/2019 2:33 PM [facility]

Workstation: [id number]

## 2019-02-09 NOTE — CT
EXAM DESCRIPTION:  Pelvis



CLINICAL HISTORY:  68 years  Female  follow up abnormal xray ?

fracture of greater troc



COMPARISON:  Plain films earlier same day.



TECHNIQUE:  Contiguous axial CT images obtained through the

pelvis without IV contrast. Reformatted images obtained.  



This exam was performed according to our department optimization

program which includes automated exposure control, adjustment of

the mA and/or kv according to patient size and/or use of

iterative reconstruction technique.



 All CT scanners at this facility use dose modulation, iterative

reconstruction, and/or weight based dosing when appropriate to

reduce radiation dose to as low as reasonably achievable (ALARA).



FINDINGS: CT confirms the presence of a moderately diastatic

comminuted fracture of the right femoral greater trochanter. No

other fracture or dislocation is seen. There is mild soft tissue

edema. There is moderate stool in the distal colon.







IMPRESSION: Right femur greater trochanter comminuted diastatic

fracture. Constipation.



No acute abnormality identified.



Electronically signed by:  Mark Halsted  2/9/2019 3:32 PM CST

Workstation: [id number]

## 2019-02-09 NOTE — RAD
EXAM DESCRIPTION: 



Femur,Right



CLINICAL HISTORY: 



fall with pain 



COMPARISON: 



CT abdomen 12/31/2018



FINDINGS: 



2 view(s) submitted. Although detail is limited, there is a

probable fracture of the right greater trochanter. No dislocation

is seen of the right femur head. No other fracture or

dislocation.



IMPRESSION: Probable fracture of the right greater trochanter

although detail is limited. CT would provide much greater detail.



Electronically signed by:  Mark Halsted  2/9/2019 2:24 PM [facility]

Workstation: [id number]

## 2019-02-09 NOTE — RAD
EXAM DESCRIPTION: 



Hip,Right 2 Views



CLINICAL HISTORY: 



fall with pain 



COMPARISON: 



None



FINDINGS: 



2 view(s) submitted. Again seen is a possible fracture of the

right greater trochanter although detail is limited. No other

fracture or dislocation.



IMPRESSION: Possible right greater trochanter fracture.



Electronically signed by:  Mark Halsted  2/9/2019 2:27 PM [facility]

Workstation: [id number]

## 2019-02-09 NOTE — ED.PDOC
History of Present Illness





- General


Chief Complaint: Trauma


Stated Complaint: fall with pain to right hip


Time Seen by Provider: 02/09/19 12:50


Source: patient, EMS


Exam Limitations: no limitations





- History of Present Illness


Initial Comments: 


the patient is an assisted living patient who comes via EMS after fall.  Patient

states she got dizzy tripped and fell landing on her right side.  She is having 

pain in the thoracic spine in the midline, her tailbone, her right hip,and right

femur.  She is able to move her legs and had no altered LOC, vision change, or 

emesis.  Patient denies any chest pain or shortness of breath.





Occurred: just prior to arrival


Severity: moderate


Injuries/Pain Location: back, lower extremity


Reason for Fall: lost balance


Loss of Consciousness: no loss of consciousness


Improving Factors: nothing


Worsening Factors: cold therapy


Associated Symptoms (Fall): denies symptoms


Allergies/Adverse Reactions: 


Allergies





Hydrocodone Allergy (Verified 02/09/19 13:01)


   


Mirtazapine Allergy (Verified 02/09/19 13:01)


   








Home Medications: 


Ambulatory Orders





Escitalopram [Lexapro] 10 mg PO DAILY 10/04/18 


Memantine [Namenda] 5 mg PO BEDTIME 10/04/18 


ALPRAZolam [Xanax] 0.5 mg PO Q8HRS #30 tab 10/09/18 


Fludrocortisone Acetate 0.1 mg PO DAILY 12/31/18 


Omeprazole Magnesium [Prilosec Otc] 40 mg PO DAILY 12/31/18 


Ondansetron HCl [Zofran] 4 mg PO Q6HRS #10 ml 12/31/18 











Review of Systems





- Review of Systems


Constitutional: States: no symptoms reported.  Denies: chills, diaphoresis, 

fever


EENTM: States: no symptoms reported.  Denies: eye pain, ear pain, nose 

congestion


Respiratory: States: no symptoms reported.  Denies: cough, short of breath


Cardiology: States: no symptoms reported.  Denies: chest pain, palpitations


Gastrointestinal/Abdominal: States: no symptoms reported.  Denies: abdominal 

pain, diarrhea, nausea, vomiting


Genitourinary: States: no symptoms reported


Musculoskeletal: States: no symptoms reported


Skin: States: no symptoms reported


Neurological: States: no symptoms reported





Past Medical History (General)





- Patient Medical History


Hx Seizures: No


Hx Stroke: No


Hx Dementia: Yes


Hx Asthma: No


Hx of COPD: Yes


Hx Cardiac Disorders: No


Hx Congestive Heart Failure: No


Hx Pacemaker: No


Hx Hypertension: Yes


Hx Thyroid Disease: No


Hx Diabetes: Yes


Hx Gastroesophageal Reflux: Yes


Hx Renal Disease: No


Hx Cancer: Yes - breast


Hx of HIV: No


Hx Hepatitis C: No


Hx MRSA: No





- Vaccination History


Hx Tetanus, Diphtheria Vaccination: No


Hx Influenza Vaccination: No


Hx Pneumococcal Vaccination:  - unknown





- Social History


Hx Tobacco Use: No


Hx Alcohol Use: No


Hx Substance Use: No


Hx Substance Use Treatment: No


Hx Depression: No





- Female History


Patient Pregnant: No





Physical Exam





- Physical Exam


General Appearance: Alert, No apparent distress


Head Injury: no evidence of injury


Eye Exam: bilateral normal


ENT Exam: hearing grossly normal, no evidence of ENT injury, no dental injury


Peripheral Pulses: radial,right: 2+, radial,left: 2+, femoral,right: 2+, 

femoral,left: 2+


Cardiovascular/Respiratory: regular rate, rhythm, no M/R/G, normal peripheral 

pulses, normal breath sounds, no respiratory distress


Gastrointestinal/Abdominal: normal bowel sounds, non tender, soft


Back Exam: normal inspection, no vertebral tenderness


Extremity Exam: bony-point tenderness - Right ischium/femoral greater trocanter,

 no shortening, no rotation, no bruising 


Neurologic: alert





Progress





- Progress


Progress: 





02/09/19 16:14


called to Ortho Dr. Gasca.  He evaluated films and states non-surgical treatment 

with walker and PT.  However patient is not in a skilled facility but an 

assisted living facility.  Will admit to get her set up with PT/walker and see 

if SNF needs to be arranged. 





- Results/Orders


Results/Orders: 





Patient Name: ALYSSA SALMON


Patient ID: [id number]


Gender: Female


YOB: 1950


Home Phone: [phone]


Referring Physician: STEVEN NGO


Organization: [facility]


Accession Number: [id number]


Requested Date: February 9, 2019 12:50


Report Status: Final


Requested Procedure: 1


Procedure Description: Femur,Right


Modality: CR


Findings


Reporting MD: Halsted, Mark


Fellow MD: Not available


Dictation Time:


: Not available


Transcription Date:


EXAM DESCRIPTION:


Femur,Right


CLINICAL HISTORY:


fall with pain


COMPARISON:


CT abdomen 12/31/2018


FINDINGS:


2 view(s) submitted. Although detail is limited, there is a


probable fracture of the right greater trochanter. No dislocation


is seen of the right femur head. No other fracture or


dislocation.


IMPRESSION: Probable fracture of the right greater trochanter


although detail is limited. CT would provide much greater detail.





Patient Name: ALYSSA SALMON


Patient ID: [id number]


Gender: Female


YOB: 1950


Home Phone: [phone]


Referring Physician: STEVEN NGO


Organization: [facility]


Accession Number: [id number]


Requested Date: February 9, 2019 12:50


Report Status: Final


Requested Procedure: 1


Procedure Description: Pelvis


Modality: CR


Findings


Reporting MD: Juventino Bingham MD: Not available


Dictation Time:


: Not available


Transcription Date:


EXAM DESCRIPTION:


Pelvis


CLINICAL HISTORY:


fall with pain


COMPARISON:


None


FINDINGS:


Frontal x-ray view of the pelvis was submitted. Patient is


rotated. There is atherosclerosis. Calcifications within the


pelvis may represent phlebolith. Evaluation of the left pubic


rami is limited by degree of rotation. Lucency at the right


greater trochanter worrisome for a nondisplaced fracture.


Correlation with a CT recommended..


IMPRESSION:


Lucency at the right greater trochanter worrisome for a


nondisplaced fracture. Correlation with a CT recommended.


Evaluation of the left pubic rami is limited by degree of


rotation.





Reporting MD: Halsted, Mark Fellow MD: Not available


Dictation Time:


: Not available


Transcription Date:


EXAM DESCRIPTION:


Hip,Right 2 Views


CLINICAL HISTORY:


fall with pain


COMPARISON:


None


FINDINGS:


2 view(s) submitted. Again seen is a possible fracture of the


right greater trochanter although detail is limited. No other


fracture or dislocation.


IMPRESSION: Possible right greater trochanter fracture.





Patient Name: ALYSSA SALMON


Patient ID: [id number]


Gender: Female


YOB: 1950


Home Phone: [phone]


Referring Physician: STEVEN NGO


Organization: [facility]


Accession Number: [id number]


Requested Date: February 9, 2019 12:50


Report Status: Final


Requested Procedure: 1


Procedure Description: Lumbar Spine 3 Views


Modality: CR


Findings


Reporting MD: Halsted, Mark Fellow MD: Not available


Dictation Time:


: Not available


Transcription Date:


EXAM DESCRIPTION:


Lumbar Spine 3 Views


CLINICAL HISTORY:


fall with pain


COMPARISON:


CT abdomen and pelvis 12/31/2018


FINDINGS:


3 view(s) submitted. There are degenerative changes. Clips are in


the right upper quadrant.


There is moderate height loss of the T12 vertebral body,


consistent with compression fractures, unchanged since the prior


exam. There is also probable fracture of the T11 vertebral body


but detail is limited. There is partial calcification of the


aorta. No additional fracture or dislocation is identified. Bone


marrow attenuation is unremarkable. No radiopaque foreign body is


identified.


IMPRESSION: Compression fracture of T12 and probable fracture of


T11, unchanged since the CT abdomen and pelvis on 12/31/2018. No


definite acute abnormality.





Patient Name: ALYSSA SALMON


Patient ID: [id number]


Gender: Female


YOB: 1950


Home Phone: [phone]


Referring Physician: STEVEN NGO


Organization: [facility]


Accession Number: [id number]


Requested Date: February 9, 2019 12:50


Report Status: Final


Requested Procedure: 1


Procedure Description: Thoracic Spine,AP Lateral


Modality: CR


Findings


Reporting MD: Halsted, Mark


Fellow MD: Not available


Dictation Time:


: Not available


Transcription Date:


EXAM DESCRIPTION:


Thoracic Spine,AP Lateral


CLINICAL HISTORY:


fall with pain


COMPARISON:


None


FINDINGS:


3 view(s) submitted. Overlying material limits detail on the


lateral view at C1 and C2. Clips are in the right upper quadrant.


There is mild rightward curvature of the thoracic spine. No


fracture or dislocation is identified. Bone marrow attenuation is


unremarkable. No radiopaque foreign body is identified.


IMPRESSION:


No acute fracture or dislocation





Findings


Reporting MD: Halsted, Mark


Fellow MD: Not available


Dictation Time:


: Not available


Transcription Date:


EXAM DESCRIPTION: Pelvis


CLINICAL HISTORY: 68 years Female follow up abnormal xray ?


fracture of greater troc


COMPARISON: Plain films earlier same day.


TECHNIQUE: Contiguous axial CT images obtained through the


pelvis without IV contrast. Reformatted images obtained.


This exam was performed according to our department optimization


program which includes automated exposure control, adjustment of


the mA and/or kv according to patient size and/or use of


iterative reconstruction technique.


All CT scanners at this facility use dose modulation, iterative


reconstruction, and/or weight based dosing when appropriate to


reduce radiation dose to as low as reasonably achievable (ALARA).


FINDINGS: CT confirms the presence of a moderately diastatic


comminuted fracture of the right femoral greater trochanter. No


other fracture or dislocation is seen. There is mild soft tissue


edema. There is moderate stool in the distal colon.


IMPRESSION: Right femur greater trochanter comminuted diastatic


fracture. Constipation.


No acute abnormality identified.





Departure





- Departure


Clinical Impression: 


Fracture of greater trochanter of right femur


Qualifiers:


 Encounter type: initial encounter Fracture type: closed Fracture alignment: 

nondisplaced Qualified Code(s): S72.114A - Nondisplaced fracture of greater 

trochanter of right femur, initial encounter for closed fracture





Disposition: Admit Patient


Condition: Good


Departure Forms:  ED Discharge - Pt. Copy, Patient Portal Self Enrollment


Instructions:  DI for Trauma


Referrals: 


Bud Mcdonald MD [Primary Care Provider] - 1-2 Weeks


Home Medications: 


Ambulatory Orders





Escitalopram [Lexapro] 10 mg PO DAILY 10/04/18 


Memantine [Namenda] 5 mg PO BEDTIME 10/04/18 


ALPRAZolam [Xanax] 0.5 mg PO Q8HRS #30 tab 10/09/18 


Fludrocortisone Acetate 0.1 mg PO DAILY 12/31/18 


Omeprazole Magnesium [Prilosec Otc] 40 mg PO DAILY 12/31/18 


Ondansetron HCl [Zofran] 4 mg PO Q6HRS #10 ml 12/31/18 











Decision To Admit





- Decistion To Admit


Decision to Admit Reason: Admit from ER


Decision to Admit Date: 02/09/19


Decision to Admit Time: 16:16

## 2019-02-09 NOTE — RAD
EXAM DESCRIPTION: 



Thoracic Spine,AP   Lateral



CLINICAL HISTORY: 



fall with pain 



COMPARISON: 



None



FINDINGS: 



3 view(s) submitted. Overlying material limits detail on the

lateral view at C1 and C2. Clips are in the right upper quadrant.

There is mild rightward curvature of the thoracic spine. No

fracture or dislocation is identified. Bone marrow attenuation is

unremarkable. No radiopaque foreign body is identified.



IMPRESSION: 



No acute fracture or dislocation.



Electronically signed by:  Mark Halsted  2/9/2019 2:33 PM [facility]

Workstation: [id number]

## 2019-02-09 NOTE — PCM.H&P
History of Present Illnes





- History of Present Illness


Reason for Visit: Fall


History of Present Illness: 





Patient is a residnet at a local Assisted Living [facility] who tripped and fell 

at home. Patient had immediate pain of right hip. No family is at bedside, only 

a son is available by phone. She has a history of dementia, but is able to 

converse about medical history somewhat. Denies having LOC or hitting head. 

States "got dizzy and fell". 





- Past Medical History


Cardiac: HTN


Pulmonary: COPD


CNS: Dementia


Gastrointestinal: GERD


Heme/Onc: Cancer - Breast


Endocrine: Diabetes





- Past Social History


Alcohol: None


Drugs: None


Lives: Alone - Assisted Living Facility





Review of Systems





- Review of Systems


Constitutional: Denies: Fever, Chills


ENT: Denies: Nose Congestion, Throat Pain


Respiratory: Denies: Cough, Shortness of Breath


Cardiovascular: Denies: Chest Pain, Palpitations


Gastrointestinal: Denies: Nausea, Vomiting


Genitourinary: Denies: Dysuria, Incontinence


Musculoskeletal: States: Leg Pain.  Denies: Back Pain


Skin: Denies: Rash, Lesions


Neurological: States: Confusion - Minimal baseline.  Denies: Change in Speech





- Medications/Allergies


Allergies/Adverse Reactions: 


                                    Allergies











Allergy/AdvReac Type Severity Reaction Status Date / Time


 


Hydrocodone Allergy   Verified 02/09/19 13:01


 


Mirtazapine Allergy   Verified 02/09/19 13:01











Medications: 


Lexapro PO


Namenda PO


Xanax PO PRN


Omeprazole PO


Zofran PO PRN





Exam





- Exam


Vital Signs: 


                             Vital Signs (72 hours)











  02/09/19 02/09/19 02/09/19





  12:40 13:39 14:00


 


Temperature   


 


Pulse Rate [ 73 71 72





pulse ox]   


 


Respiratory 20 20 18





Rate   


 


Blood Pressure 112/66 114/76 133/74





[Left Arm]   


 


O2 Sat by Pulse 92 L 99 92 L





Oximetry   














  02/09/19 02/09/19 02/09/19





  15:00 16:00 17:00


 


Temperature  98.1 F 


 


Pulse Rate [ 71 76 94 H





pulse ox]   


 


Respiratory 18 18 20





Rate   


 


Blood Pressure 112/66 133/74 147/72





[Left Arm]   


 


O2 Sat by Pulse 95 98 97





Oximetry   














  02/09/19





  18:00


 


Temperature 98.2 F


 


Pulse Rate [ 96 H





pulse ox] 


 


Respiratory 18





Rate 


 


Blood Pressure 103/72





[Left Arm] 


 


O2 Sat by Pulse 98





Oximetry 











General: Alert, Other - Oriented to person place only


HEENT: Atraumatic, PERRLA - Left pupil smaller then right, both reactive


Lungs: Clear to auscultation, Normal air movement


Cardiovascular: Regular rate, No murmurs


Abdomen: Normal bowel sounds, Soft


Extremities: No edema, Other - Tender over right trochanter area, some soft 

tissue redness from impact, no other skin findings


Skin: No rashes


Neurological: Normal speech, Cranial nerves 3-12 NL





Assessment/Plan





- Assessment/Plan


Assessment: 





Imaging:


CT Ab/Pel - T12 compression fracture w/ potential T11 compr. fracture


Hip/Pelvis Xray - potential for right trachanteric fracture, correlate [facility] CT


CT Pelvis - Rt greater trochanteric comminuted diastatic fracutre








1. Rt greater trochanter fracture, non-displaced, non operative


2. Dementia


3. COPD


4. HTN


5. DM (unconfirmed)


6. GERD


7. Hx of Breast Cancer


Plan: 





After discussing patient's status with [facility] with [facility], patient did 

not meet criteria to keep in the hospital for placement and PT. Certain criteria

were not met due to patient's excellent clinical status. Patient required very 

minimal pain control, not enough to meet admission criteria. Unfortunately 

admission for placement /PT cannot be obtained without poor financial 

endangerment of the patient. [facility] from [place], [place] was contacted and they

have agreed to evaluate for potential admission to Rehab facility from the [facility] 

ER.  





After evaluation, patient was accepted to [facility] in [place]. 

Unfortunately, the son doesn't have the means to get her there tonight. Thus, 

she is being discharged back to [place] and the facility will transport her to [facility] in the morning.

## 2019-02-09 NOTE — RAD
EXAM DESCRIPTION: 



Lumbar Spine 3 Views



CLINICAL HISTORY: 



fall with pain 



COMPARISON: 



CT abdomen and pelvis 12/31/2018



FINDINGS: 



3 view(s) submitted. There are degenerative changes. Clips are in

the right upper quadrant.



There is moderate height loss of the T12 vertebral body,

consistent with compression fractures, unchanged since the prior

exam. There is also probable fracture of the T11 vertebral body

but detail is limited. There is partial calcification of the

aorta. No additional fracture or dislocation is identified. Bone

marrow attenuation is unremarkable. No radiopaque foreign body is

identified.



IMPRESSION: Compression fracture of T12 and probable fracture of

T11, unchanged since the CT abdomen and pelvis on 12/31/2018. No

definite acute abnormality.



Electronically signed by:  Mark Halsted  2/9/2019 2:32 PM [facility]

Workstation: [id number]

## 2019-02-10 VITALS — TEMPERATURE: 97.6 F

## 2019-02-10 VITALS — DIASTOLIC BLOOD PRESSURE: 70 MMHG | OXYGEN SATURATION: 96 % | SYSTOLIC BLOOD PRESSURE: 113 MMHG
